# Patient Record
Sex: FEMALE | HISPANIC OR LATINO | Employment: OTHER | URBAN - METROPOLITAN AREA
[De-identification: names, ages, dates, MRNs, and addresses within clinical notes are randomized per-mention and may not be internally consistent; named-entity substitution may affect disease eponyms.]

---

## 2019-10-17 ENCOUNTER — HOSPITAL ENCOUNTER (INPATIENT)
Facility: MEDICAL CENTER | Age: 74
LOS: 5 days | DRG: 028 | End: 2019-10-22
Attending: EMERGENCY MEDICINE | Admitting: HOSPITALIST
Payer: MEDICAID

## 2019-10-17 ENCOUNTER — APPOINTMENT (OUTPATIENT)
Dept: RADIOLOGY | Facility: MEDICAL CENTER | Age: 74
DRG: 028 | End: 2019-10-17
Attending: EMERGENCY MEDICINE
Payer: MEDICAID

## 2019-10-17 DIAGNOSIS — R20.2 PARESTHESIAS: ICD-10-CM

## 2019-10-17 DIAGNOSIS — S06.4XAA EPIDURAL HEMATOMA (HCC): ICD-10-CM

## 2019-10-17 DIAGNOSIS — I62.1: ICD-10-CM

## 2019-10-17 DIAGNOSIS — M54.50 ACUTE LOW BACK PAIN, UNSPECIFIED BACK PAIN LATERALITY, UNSPECIFIED WHETHER SCIATICA PRESENT: ICD-10-CM

## 2019-10-17 DIAGNOSIS — I10 HTN (HYPERTENSION): ICD-10-CM

## 2019-10-17 PROBLEM — E87.6 HYPOKALEMIA: Status: ACTIVE | Noted: 2019-10-17

## 2019-10-17 LAB
ANION GAP SERPL CALC-SCNC: 12 MMOL/L (ref 0–11.9)
APTT PPP: 29.8 SEC (ref 24.7–36)
BASOPHILS # BLD AUTO: 0.4 % (ref 0–1.8)
BASOPHILS # BLD: 0.04 K/UL (ref 0–0.12)
BUN SERPL-MCNC: 16 MG/DL (ref 8–22)
CALCIUM SERPL-MCNC: 9.7 MG/DL (ref 8.5–10.5)
CFT BLD TEG: 6.5 MIN (ref 5–10)
CHLORIDE SERPL-SCNC: 103 MMOL/L (ref 96–112)
CLOT ANGLE BLD TEG: 59.9 DEGREES (ref 53–72)
CLOT LYSIS 30M P MA LENFR BLD TEG: 0 % (ref 0–8)
CO2 SERPL-SCNC: 24 MMOL/L (ref 20–33)
CREAT SERPL-MCNC: 0.75 MG/DL (ref 0.5–1.4)
CT.EXTRINSIC BLD ROTEM: 2.2 MIN (ref 1–3)
EOSINOPHIL # BLD AUTO: 0.03 K/UL (ref 0–0.51)
EOSINOPHIL NFR BLD: 0.3 % (ref 0–6.9)
ERYTHROCYTE [DISTWIDTH] IN BLOOD BY AUTOMATED COUNT: 43.8 FL (ref 35.9–50)
EST. AVERAGE GLUCOSE BLD GHB EST-MCNC: 123 MG/DL
GLUCOSE SERPL-MCNC: 146 MG/DL (ref 65–99)
HBA1C MFR BLD: 5.9 % (ref 0–5.6)
HCT VFR BLD AUTO: 46.8 % (ref 37–47)
HGB BLD-MCNC: 14.9 G/DL (ref 12–16)
IMM GRANULOCYTES # BLD AUTO: 0.04 K/UL (ref 0–0.11)
IMM GRANULOCYTES NFR BLD AUTO: 0.4 % (ref 0–0.9)
INR PPP: 0.94 (ref 0.87–1.13)
LYMPHOCYTES # BLD AUTO: 1.06 K/UL (ref 1–4.8)
LYMPHOCYTES NFR BLD: 10.5 % (ref 22–41)
MCF BLD TEG: 65.8 MM (ref 50–70)
MCH RBC QN AUTO: 27.9 PG (ref 27–33)
MCHC RBC AUTO-ENTMCNC: 31.8 G/DL (ref 33.6–35)
MCV RBC AUTO: 87.5 FL (ref 81.4–97.8)
MONOCYTES # BLD AUTO: 0.33 K/UL (ref 0–0.85)
MONOCYTES NFR BLD AUTO: 3.3 % (ref 0–13.4)
NEUTROPHILS # BLD AUTO: 8.6 K/UL (ref 2–7.15)
NEUTROPHILS NFR BLD: 85.1 % (ref 44–72)
NRBC # BLD AUTO: 0 K/UL
NRBC BLD-RTO: 0 /100 WBC
PA AA BLD-ACNC: 40.8 %
PA ADP BLD-ACNC: 1.6 %
PLATELET # BLD AUTO: 224 K/UL (ref 164–446)
PMV BLD AUTO: 11.4 FL (ref 9–12.9)
POTASSIUM SERPL-SCNC: 3.5 MMOL/L (ref 3.6–5.5)
PROTHROMBIN TIME: 12.8 SEC (ref 12–14.6)
RBC # BLD AUTO: 5.35 M/UL (ref 4.2–5.4)
SODIUM SERPL-SCNC: 139 MMOL/L (ref 135–145)
TEG ALGORITHM TGALG: NORMAL
WBC # BLD AUTO: 10.1 K/UL (ref 4.8–10.8)

## 2019-10-17 PROCEDURE — A9270 NON-COVERED ITEM OR SERVICE: HCPCS | Performed by: EMERGENCY MEDICINE

## 2019-10-17 PROCEDURE — 700105 HCHG RX REV CODE 258: Performed by: HOSPITALIST

## 2019-10-17 PROCEDURE — 83036 HEMOGLOBIN GLYCOSYLATED A1C: CPT

## 2019-10-17 PROCEDURE — 700102 HCHG RX REV CODE 250 W/ 637 OVERRIDE(OP): Performed by: EMERGENCY MEDICINE

## 2019-10-17 PROCEDURE — 85347 COAGULATION TIME ACTIVATED: CPT

## 2019-10-17 PROCEDURE — 99285 EMERGENCY DEPT VISIT HI MDM: CPT

## 2019-10-17 PROCEDURE — A9270 NON-COVERED ITEM OR SERVICE: HCPCS | Performed by: HOSPITALIST

## 2019-10-17 PROCEDURE — 72100 X-RAY EXAM L-S SPINE 2/3 VWS: CPT

## 2019-10-17 PROCEDURE — 72149 MRI LUMBAR SPINE W/DYE: CPT

## 2019-10-17 PROCEDURE — 85576 BLOOD PLATELET AGGREGATION: CPT | Mod: 91

## 2019-10-17 PROCEDURE — A9576 INJ PROHANCE MULTIPACK: HCPCS | Performed by: EMERGENCY MEDICINE

## 2019-10-17 PROCEDURE — 304561 HCHG STAT O2

## 2019-10-17 PROCEDURE — 84443 ASSAY THYROID STIM HORMONE: CPT

## 2019-10-17 PROCEDURE — 700117 HCHG RX CONTRAST REV CODE 255: Performed by: EMERGENCY MEDICINE

## 2019-10-17 PROCEDURE — 85025 COMPLETE CBC W/AUTO DIFF WBC: CPT

## 2019-10-17 PROCEDURE — 96375 TX/PRO/DX INJ NEW DRUG ADDON: CPT

## 2019-10-17 PROCEDURE — 85384 FIBRINOGEN ACTIVITY: CPT

## 2019-10-17 PROCEDURE — 80048 BASIC METABOLIC PNL TOTAL CA: CPT

## 2019-10-17 PROCEDURE — 770001 HCHG ROOM/CARE - MED/SURG/GYN PRIV*

## 2019-10-17 PROCEDURE — 96374 THER/PROPH/DIAG INJ IV PUSH: CPT

## 2019-10-17 PROCEDURE — 72148 MRI LUMBAR SPINE W/O DYE: CPT

## 2019-10-17 PROCEDURE — 99223 1ST HOSP IP/OBS HIGH 75: CPT | Performed by: HOSPITALIST

## 2019-10-17 PROCEDURE — 700102 HCHG RX REV CODE 250 W/ 637 OVERRIDE(OP): Performed by: HOSPITALIST

## 2019-10-17 PROCEDURE — 700111 HCHG RX REV CODE 636 W/ 250 OVERRIDE (IP): Performed by: EMERGENCY MEDICINE

## 2019-10-17 PROCEDURE — 36415 COLL VENOUS BLD VENIPUNCTURE: CPT

## 2019-10-17 PROCEDURE — 85730 THROMBOPLASTIN TIME PARTIAL: CPT

## 2019-10-17 PROCEDURE — 85610 PROTHROMBIN TIME: CPT

## 2019-10-17 RX ORDER — VALSARTAN 80 MG/1
320 TABLET ORAL ONCE
Status: COMPLETED | OUTPATIENT
Start: 2019-10-17 | End: 2019-10-17

## 2019-10-17 RX ORDER — POLYETHYLENE GLYCOL 3350 17 G/17G
1 POWDER, FOR SOLUTION ORAL
Status: DISCONTINUED | OUTPATIENT
Start: 2019-10-17 | End: 2019-10-22 | Stop reason: HOSPADM

## 2019-10-17 RX ORDER — OXYCODONE HYDROCHLORIDE 5 MG/1
2.5 TABLET ORAL
Status: DISCONTINUED | OUTPATIENT
Start: 2019-10-17 | End: 2019-10-20

## 2019-10-17 RX ORDER — METOPROLOL SUCCINATE 50 MG/1
100 TABLET, EXTENDED RELEASE ORAL ONCE
Status: COMPLETED | OUTPATIENT
Start: 2019-10-17 | End: 2019-10-17

## 2019-10-17 RX ORDER — ONDANSETRON 2 MG/ML
4 INJECTION INTRAMUSCULAR; INTRAVENOUS ONCE
Status: COMPLETED | OUTPATIENT
Start: 2019-10-17 | End: 2019-10-17

## 2019-10-17 RX ORDER — OXYCODONE HYDROCHLORIDE 5 MG/1
5 TABLET ORAL
Status: DISCONTINUED | OUTPATIENT
Start: 2019-10-17 | End: 2019-10-20

## 2019-10-17 RX ORDER — HYDROCHLOROTHIAZIDE 25 MG/1
25 TABLET ORAL ONCE
Status: COMPLETED | OUTPATIENT
Start: 2019-10-17 | End: 2019-10-17

## 2019-10-17 RX ORDER — SODIUM CHLORIDE 9 MG/ML
INJECTION, SOLUTION INTRAVENOUS CONTINUOUS
Status: DISCONTINUED | OUTPATIENT
Start: 2019-10-17 | End: 2019-10-21

## 2019-10-17 RX ORDER — MORPHINE SULFATE 4 MG/ML
4 INJECTION, SOLUTION INTRAMUSCULAR; INTRAVENOUS ONCE
Status: COMPLETED | OUTPATIENT
Start: 2019-10-17 | End: 2019-10-17

## 2019-10-17 RX ORDER — HYDROMORPHONE HYDROCHLORIDE 1 MG/ML
0.25 INJECTION, SOLUTION INTRAMUSCULAR; INTRAVENOUS; SUBCUTANEOUS
Status: DISCONTINUED | OUTPATIENT
Start: 2019-10-17 | End: 2019-10-20

## 2019-10-17 RX ORDER — ONDANSETRON 2 MG/ML
4 INJECTION INTRAMUSCULAR; INTRAVENOUS EVERY 4 HOURS PRN
Status: DISCONTINUED | OUTPATIENT
Start: 2019-10-17 | End: 2019-10-22 | Stop reason: HOSPADM

## 2019-10-17 RX ORDER — LEVOTHYROXINE SODIUM 112 UG/1
112 TABLET ORAL
Status: DISCONTINUED | OUTPATIENT
Start: 2019-10-17 | End: 2019-10-22 | Stop reason: HOSPADM

## 2019-10-17 RX ORDER — POTASSIUM CHLORIDE 20 MEQ/1
20 TABLET, EXTENDED RELEASE ORAL ONCE
Status: COMPLETED | OUTPATIENT
Start: 2019-10-17 | End: 2019-10-17

## 2019-10-17 RX ORDER — LABETALOL HYDROCHLORIDE 5 MG/ML
10 INJECTION, SOLUTION INTRAVENOUS EVERY 4 HOURS PRN
Status: DISCONTINUED | OUTPATIENT
Start: 2019-10-17 | End: 2019-10-22 | Stop reason: HOSPADM

## 2019-10-17 RX ORDER — KETOROLAC TROMETHAMINE 30 MG/ML
15 INJECTION, SOLUTION INTRAMUSCULAR; INTRAVENOUS ONCE
Status: COMPLETED | OUTPATIENT
Start: 2019-10-17 | End: 2019-10-17

## 2019-10-17 RX ORDER — AMOXICILLIN 250 MG
2 CAPSULE ORAL 2 TIMES DAILY
Status: DISCONTINUED | OUTPATIENT
Start: 2019-10-17 | End: 2019-10-22 | Stop reason: HOSPADM

## 2019-10-17 RX ORDER — LISINOPRIL 10 MG/1
10 TABLET ORAL DAILY
COMMUNITY
End: 2019-10-17

## 2019-10-17 RX ORDER — ONDANSETRON 4 MG/1
4 TABLET, ORALLY DISINTEGRATING ORAL EVERY 4 HOURS PRN
Status: DISCONTINUED | OUTPATIENT
Start: 2019-10-17 | End: 2019-10-22 | Stop reason: HOSPADM

## 2019-10-17 RX ORDER — BISACODYL 10 MG
10 SUPPOSITORY, RECTAL RECTAL
Status: DISCONTINUED | OUTPATIENT
Start: 2019-10-17 | End: 2019-10-22 | Stop reason: HOSPADM

## 2019-10-17 RX ORDER — LEVOTHYROXINE SODIUM 112 UG/1
112 TABLET ORAL
COMMUNITY
End: 2024-02-12

## 2019-10-17 RX ADMIN — SENNOSIDES, DOCUSATE SODIUM 2 TABLET: 50; 8.6 TABLET, FILM COATED ORAL at 17:27

## 2019-10-17 RX ADMIN — OXYCODONE HYDROCHLORIDE 5 MG: 5 TABLET ORAL at 17:27

## 2019-10-17 RX ADMIN — SODIUM CHLORIDE: 9 INJECTION, SOLUTION INTRAVENOUS at 17:29

## 2019-10-17 RX ADMIN — ONDANSETRON 4 MG: 2 INJECTION INTRAMUSCULAR; INTRAVENOUS at 07:40

## 2019-10-17 RX ADMIN — KETOROLAC TROMETHAMINE 15 MG: 30 INJECTION, SOLUTION INTRAMUSCULAR at 07:54

## 2019-10-17 RX ADMIN — GADOTERIDOL 15 ML: 279.3 INJECTION, SOLUTION INTRAVENOUS at 13:30

## 2019-10-17 RX ADMIN — METOPROLOL SUCCINATE 100 MG: 50 TABLET, EXTENDED RELEASE ORAL at 09:01

## 2019-10-17 RX ADMIN — MORPHINE SULFATE 4 MG: 4 INJECTION INTRAVENOUS at 07:40

## 2019-10-17 RX ADMIN — POTASSIUM CHLORIDE 20 MEQ: 1500 TABLET, EXTENDED RELEASE ORAL at 17:27

## 2019-10-17 RX ADMIN — HYDROCHLOROTHIAZIDE 25 MG: 25 TABLET ORAL at 09:01

## 2019-10-17 RX ADMIN — VALSARTAN 320 MG: 80 TABLET, FILM COATED ORAL at 09:01

## 2019-10-17 ASSESSMENT — COGNITIVE AND FUNCTIONAL STATUS - GENERAL
MOVING FROM LYING ON BACK TO SITTING ON SIDE OF FLAT BED: A LITTLE
STANDING UP FROM CHAIR USING ARMS: A LITTLE
SUGGESTED CMS G CODE MODIFIER MOBILITY: CK
MOVING TO AND FROM BED TO CHAIR: A LITTLE
DAILY ACTIVITIY SCORE: 24
MOBILITY SCORE: 19
SUGGESTED CMS G CODE MODIFIER DAILY ACTIVITY: CH
CLIMB 3 TO 5 STEPS WITH RAILING: A LITTLE
WALKING IN HOSPITAL ROOM: A LITTLE

## 2019-10-17 ASSESSMENT — PATIENT HEALTH QUESTIONNAIRE - PHQ9
1. LITTLE INTEREST OR PLEASURE IN DOING THINGS: NOT AT ALL
SUM OF ALL RESPONSES TO PHQ9 QUESTIONS 1 AND 2: 0
2. FEELING DOWN, DEPRESSED, IRRITABLE, OR HOPELESS: NOT AT ALL

## 2019-10-17 ASSESSMENT — LIFESTYLE VARIABLES
EVER_SMOKED: NEVER
AVERAGE NUMBER OF DAYS PER WEEK YOU HAVE A DRINK CONTAINING ALCOHOL: 0
HAVE YOU EVER FELT YOU SHOULD CUT DOWN ON YOUR DRINKING: NO
TOTAL SCORE: 0
EVER FELT BAD OR GUILTY ABOUT YOUR DRINKING: NO
DOES PATIENT WANT TO STOP DRINKING: NO
TOTAL SCORE: 0
HOW MANY TIMES IN THE PAST YEAR HAVE YOU HAD 5 OR MORE DRINKS IN A DAY: 0
TOTAL SCORE: 0
DO YOU DRINK ALCOHOL: NO
HAVE PEOPLE ANNOYED YOU BY CRITICIZING YOUR DRINKING: NO
ON A TYPICAL DAY WHEN YOU DRINK ALCOHOL HOW MANY DRINKS DO YOU HAVE: 0
CONSUMPTION TOTAL: NEGATIVE
EVER HAD A DRINK FIRST THING IN THE MORNING TO STEADY YOUR NERVES TO GET RID OF A HANGOVER: NO
ALCOHOL_USE: NO

## 2019-10-17 NOTE — ED PROVIDER NOTES
ED Provider Note    Scribed for Juan Cornell M.D. by Sunday Francis. 10/17/2019  7:26 AM    Primary care provider: Susi Grant M.D.  Means of arrival: walk in  History obtained from: patient  History limited by: none    CHIEF COMPLAINT  Chief Complaint   Patient presents with   • Low Back Pain     Lower back pain since 2 am   • Numbness     right lower leg numbness since 2am       HPI  Desiree Salas is a 74 y.o. female who presents to the Emergency Department complaining of sudden low back pain starting 5 hours ago. She describes her pain as moderate that radiates down her right leg. Patient reports associated calf numbness. She states that her pain came on suddenly this morning with no precipitating factors. Patient reports that her pain has not improved, promtiong her to come to the ED for evaluation. She reports a history of hypertension, cancer. Patient denies focal weakness, trauma.      REVIEW OF SYSTEMS  Pertinent positives include back pain, numbness.   Pertinent negatives include no focal weakness, trauma.    All other systems reviewed and negative. See HPI for further details.     PAST MEDICAL HISTORY   has a past medical history of ASTHMA, Breast mass, Dyslipidemia, Heart attack (HCC), HTN, Post-menopausal, and Thyroid disease.    SURGICAL HISTORY  patient denies any surgical history    SOCIAL HISTORY  Social History     Tobacco Use   • Smoking status: Never Smoker   • Smokeless tobacco: Never Used   Substance Use Topics   • Alcohol use: No   • Drug use: No      Social History     Substance and Sexual Activity   Drug Use No       FAMILY HISTORY  Family History   Problem Relation Age of Onset   • Heart Disease Father        CURRENT MEDICATIONS  Home Medications     Reviewed by Gil Brumfield R.N. (Registered Nurse) on 10/17/19 at 0659  Med List Status: Partial   Medication Last Dose Status   aspirin 81 MG EC tablet  Active   atorvastatin (LIPITOR) 40 MG TABS  Active  "  ergocalciferol (DRISDOL) 85164 UNIT capsule  Active   hydrochlorothiazide (HYDRODIURIL) 25 MG TABS  Active   levothyroxine (SYNTHROID) 175 MCG TABS  Active   metoprolol (LOPRESSOR) 100 MG TABS  Active   valsartan (DIOVAN) 320 MG tablet  Active                ALLERGIES  Allergies   Allergen Reactions   • Pcn [Penicillins] Rash   • Tylenol [Acetaminophen]      Dizziness        PHYSICAL EXAM  VITAL SIGNS: BP (!) 199/101   Pulse 70   Temp 35.9 °C (96.6 °F) (Temporal)   Resp 16   Ht 1.499 m (4' 11\")   Wt 72.6 kg (160 lb)   LMP 02/05/2003   SpO2 98%   BMI 32.32 kg/m²     Nursing note and vitals reviewed.  Constitutional: Well-developed and well-nourished. Mild distress.   HENT: Head is normocephalic and atraumatic. Oropharynx is clear and moist without exudate or erythema.   Eyes: Pupils are equal, round, and reactive to light. Conjunctiva are normal.   Cardiovascular: Normal rate and regular rhythm. No murmur heard. Normal radial pulses.  Pulmonary/Chest: Breath sounds normal. No wheezes or rales.   Abdominal: Soft and non-tender. No distention    Musculoskeletal: Extremities exhibit normal range of motion without edema. Lumbar paraspinal tenderness with muscle spasm.   Neurological: Awake, alert and oriented to person, place, and time. No focal deficits noted. Decreased sensation to light tough in right anterior thigh.   Skin: Skin is warm and dry. No rash.   Psychiatric: Normal mood and affect. Appropriate for clinical situation.    DIAGNOSTIC STUDIES / PROCEDURES    LABS  Results for orders placed or performed during the hospital encounter of 10/17/19   CBC WITH DIFFERENTIAL   Result Value Ref Range    WBC 10.1 4.8 - 10.8 K/uL    RBC 5.35 4.20 - 5.40 M/uL    Hemoglobin 14.9 12.0 - 16.0 g/dL    Hematocrit 46.8 37.0 - 47.0 %    MCV 87.5 81.4 - 97.8 fL    MCH 27.9 27.0 - 33.0 pg    MCHC 31.8 (L) 33.6 - 35.0 g/dL    RDW 43.8 35.9 - 50.0 fL    Platelet Count 224 164 - 446 K/uL    MPV 11.4 9.0 - 12.9 fL    " Neutrophils-Polys 85.10 (H) 44.00 - 72.00 %    Lymphocytes 10.50 (L) 22.00 - 41.00 %    Monocytes 3.30 0.00 - 13.40 %    Eosinophils 0.30 0.00 - 6.90 %    Basophils 0.40 0.00 - 1.80 %    Immature Granulocytes 0.40 0.00 - 0.90 %    Nucleated RBC 0.00 /100 WBC    Neutrophils (Absolute) 8.60 (H) 2.00 - 7.15 K/uL    Lymphs (Absolute) 1.06 1.00 - 4.80 K/uL    Monos (Absolute) 0.33 0.00 - 0.85 K/uL    Eos (Absolute) 0.03 0.00 - 0.51 K/uL    Baso (Absolute) 0.04 0.00 - 0.12 K/uL    Immature Granulocytes (abs) 0.04 0.00 - 0.11 K/uL    NRBC (Absolute) 0.00 K/uL   BASIC METABOLIC PANEL   Result Value Ref Range    Sodium 139 135 - 145 mmol/L    Potassium 3.5 (L) 3.6 - 5.5 mmol/L    Chloride 103 96 - 112 mmol/L    Co2 24 20 - 33 mmol/L    Glucose 146 (H) 65 - 99 mg/dL    Bun 16 8 - 22 mg/dL    Creatinine 0.75 0.50 - 1.40 mg/dL    Calcium 9.7 8.5 - 10.5 mg/dL    Anion Gap 12.0 (H) 0.0 - 11.9   PROTHROMBIN TIME (INR)   Result Value Ref Range    PT 12.8 12.0 - 14.6 sec    INR 0.94 0.87 - 1.13   APTT   Result Value Ref Range    APTT 29.8 24.7 - 36.0 sec   ESTIMATED GFR   Result Value Ref Range    GFR If African American >60 >60 mL/min/1.73 m 2    GFR If Non African American >60 >60 mL/min/1.73 m 2   All labs reviewed by me.    RADIOLOGY  MR-LUMBAR SPINE-W/O   Final Result         1.  EXTENSIVE LARGE ACUTE DORSAL EPIDURAL HEMATOMA causing marked compression of the posterior aspect of the thecal sac from the T11 level to the sacrum.      These findings were discussed with Dr LADY WELSH at 11:10 AM 10/17/2019.      DX-LUMBAR SPINE-2 OR 3 VIEWS   Final Result         1.  No acute findings.      2.  Mild multilevel degenerative disc disease.      3.  Severe facet arthropathy in the lower lumbar spine.      4.  Minimal retrolisthesis at L2-3.      5.  Minimal anterolisthesis at L4-5 and L5-S1.      MR-LUMBAR SPINE-WITH    (Results Pending)   The radiologist's interpretation of all radiological studies have been reviewed by  me.    COURSE & MEDICAL DECISION MAKING  Nursing notes, VS, PMSFHx reviewed in chart.     7:26 AM - Patient seen and examined at bedside. Based on her advanced age and history of cancer, I discussed obtaining an MRI scan of the area to evaluate. Patient verbalizes understanding and agreement to this plan of care. Patient will be treated with Morphine 4 mg, Toradol 15 mg Zofran 4 mg. Ordered MR lumbar, DX lumbar spine, CBC with differential, BMP, PT/INR, APTT to evaluate her symptoms. The differential diagnoses include but are not limited to: metastatic mass, sciatica, spinal fracture.     8:39 AM - Patient will be treated with Diovan 320 mg, Toprol  mg, Hydrodiuril 25 mg.     11:10 AM - MRI indicates large epidural hematoma.     11:16 AM - Paged neurosurgery.     11:35 AM - I discussed the patient's case and the above findings with Dr. Cruz (neurosurgery) who agrees to consult. He requests MRI with IV contrast. Ordered for MR lumbar with contrast to further evaluate.     11:39 AM - I discussed the patient's case and the above findings via Anna Text with Dr. Iniguez (hospitalist) who agrees to admit the patient.     11:54 AM - Patient reevaluated at bedside. She remains neurologically intact with the exception of numbness in right foot. Discussed results. Patient confirms that she has not had injections or trauma to the area. Discussed admission to the hospital for continued observation and treatment. Patient and family verbalize agreement and understanding.      DISPOSITION:  Patient will be admitted to hospital in critical condition.    CRITICAL CARE  I provided critical care services, which included medication orders, frequent reevaluations of the patient's condition and response to treatment, ordering and reviewing test results, and discussing the case with various consultants.  The critical care time associated with the care of the patient was 35 minutes. Review chart for interventions. This time is  exclusive of any other billable procedures.      FINAL IMPRESSION  1. Acute midline low back pain with right-sided sciatica    2. Nontraumatic epidural hematoma (HCC)     Critical care time 35 minutes exclusive of any other billable procedures.     ISunday (Scribe), am scribing for, and in the presence of, Juan Cornell M.D..    Electronically signed by: Sunday Francis (Scribe), 10/17/2019    I, Juan Cornell M.D. personally performed the services described in this documentation, as scribed by Sunday Francis in my presence, and it is both accurate and complete. C    The note accurately reflects work and decisions made by me.  Juan Cornell  10/17/2019  1:06 PM

## 2019-10-17 NOTE — ASSESSMENT & PLAN NOTE
Last TSH was in 2013   continue levothyroxine home dose 112 at this time and reassess with new labs

## 2019-10-17 NOTE — ASSESSMENT & PLAN NOTE
Oral BP meds were held on admission to avoid hypotension  She developed hypertension and then later hypotension with orthostatic signs   Echo did not show any abnormalities  We will resume valsartan with low-dose 80 mg daily(home dose is 320 mg)  Monitoring closely PRN IV labetalol  Adjust her dose or add a new agent if needed.

## 2019-10-17 NOTE — H&P
Hospital Medicine History & Physical Note    Date of Service  10/17/2019    Primary Care Physician  Susi Grant M.D.    Consultants  NSGY    Code Status  Full    Chief Complaint  Back pain    History of Presenting Illness  74 y.o. female who presented 10/17/2019 with history of hypertension dyslipidemia reports that this morning while trying to go to the bathroom she noted weakness in her right leg so she had to crawl back to bed she was transferred to the emergency room for further evaluation.  She is complaining of low back pain moderate sharp radiates down her right lower extremity and right buttock associated with numbness in her right calf and her right foot.  No perineal numbness no change in urine or bowel habits.  No falls or trauma.  She occasionally takes low-dose aspirin she took one yesterday.  She denies any other antiplatelet or anticoagulant.    Review of Systems  Review of Systems   All other systems reviewed and are negative.      Past Medical History   has a past medical history of ASTHMA, Breast mass, Dyslipidemia, Heart attack (HCC), HTN, Post-menopausal, and Thyroid disease.    Surgical History  Negative per patient    Family History  family history includes Heart Disease in her father.     Social History   reports that she has never smoked. She has never used smokeless tobacco. She reports that she does not drink alcohol or use drugs.    Allergies  Allergies   Allergen Reactions   • Pcn [Penicillins] Rash   • Tylenol [Acetaminophen]      Dizziness        Medications  Prior to Admission Medications   Prescriptions Last Dose Informant Patient Reported? Taking?   levothyroxine (SYNTHROID) 112 MCG Tab 10/16/2019 at unknown Patient's Home Pharmacy Yes Yes   Sig: Take 112 mcg by mouth Every morning on an empty stomach.   metoprolol (LOPRESSOR) 100 MG TABS 10/16/2019 at unknown Patient's Home Pharmacy No No   Sig: Take 1 Tab by mouth 2 times a day.   valsartan (DIOVAN) 320 MG tablet 10/16/2019 at  unknown Patient's Home Pharmacy No No   Sig: Take 1 Tab by mouth every day.      Facility-Administered Medications: None       Physical Exam  Temp:  [35.9 °C (96.6 °F)] 35.9 °C (96.6 °F)  Pulse:  [58-75] 68  Resp:  [16] 16  BP: (130-215)/() 156/76  SpO2:  [94 %-98 %] 97 %    Physical Exam   Constitutional: She is oriented to person, place, and time. She appears well-developed and well-nourished.   HENT:   Head: Normocephalic and atraumatic.   Right Ear: External ear normal.   Left Ear: External ear normal.   Mouth/Throat: No oropharyngeal exudate.   Eyes: Conjunctivae are normal. Right eye exhibits no discharge. Left eye exhibits no discharge. No scleral icterus.   Neck: Neck supple. No JVD present. No tracheal deviation present.   Cardiovascular: Normal rate and regular rhythm. Exam reveals no gallop and no friction rub.   No murmur heard.  Pulmonary/Chest: Effort normal and breath sounds normal. No stridor. No respiratory distress. She has no wheezes. She has no rales. She exhibits no tenderness.   Abdominal: Soft. Bowel sounds are normal. She exhibits no distension and no mass. There is no tenderness. There is no rebound and no guarding.   Musculoskeletal: She exhibits no edema or tenderness.   Neurological: She is alert and oriented to person, place, and time. No cranial nerve deficit. She exhibits abnormal muscle tone (Weakness right dorsiflexion).   Skin: Skin is warm and dry. She is not diaphoretic. No cyanosis. Nails show no clubbing.   Psychiatric: She has a normal mood and affect. Her behavior is normal. Thought content normal.   Nursing note and vitals reviewed.      Laboratory:  Recent Labs     10/17/19  0720   WBC 10.1   RBC 5.35   HEMOGLOBIN 14.9   HEMATOCRIT 46.8   MCV 87.5   MCH 27.9   MCHC 31.8*   RDW 43.8   PLATELETCT 224   MPV 11.4     Recent Labs     10/17/19  0720   SODIUM 139   POTASSIUM 3.5*   CHLORIDE 103   CO2 24   GLUCOSE 146*   BUN 16   CREATININE 0.75   CALCIUM 9.7     Recent Labs      10/17/19  0720   GLUCOSE 146*     Recent Labs     10/17/19  0720   APTT 29.8   INR 0.94     No results for input(s): NTPROBNP in the last 72 hours.      No results for input(s): TROPONINT in the last 72 hours.    Urinalysis:    No results found     Imaging:  MR-LUMBAR SPINE-WITH   Final Result         1.  Large acute dorsal epidural hematoma causing marked compression of the thecal sac from the T12 level to the sacrum.      2.  No evidence of abnormal intradural or extradural enhancement in the lumbar region.      MR-LUMBAR SPINE-W/O   Final Result         1.  EXTENSIVE LARGE ACUTE DORSAL EPIDURAL HEMATOMA causing marked compression of the posterior aspect of the thecal sac from the T11 level to the sacrum.      These findings were discussed with Dr LADY WELSH at 11:10 AM 10/17/2019.      DX-LUMBAR SPINE-2 OR 3 VIEWS   Final Result         1.  No acute findings.      2.  Mild multilevel degenerative disc disease.      3.  Severe facet arthropathy in the lower lumbar spine.      4.  Minimal retrolisthesis at L2-3.      5.  Minimal anterolisthesis at L4-5 and L5-S1.            Assessment/Plan:  I anticipate this patient will require at least two midnights for appropriate medical management, necessitating inpatient admission.    * Epidural hematoma (HCC)  Assessment & Plan  Uncertain etiology  MRI with contrast negative for mass, no history of trauma   No coagulopathy on TEG    Dr. Cruz from neurosurgery has been consulted will await his evaluation and further recommendations  Serial neurologic exams      Hypokalemia  Assessment & Plan  Replete and monitor     IFG (impaired fasting glucose)- (present on admission)  Assessment & Plan  Monitor CBGs  Check HbA1c    Hypothyroid- (present on admission)  Assessment & Plan  Continue levothyroxine    Hypertension- (present on admission)  Assessment & Plan  We will hold oral BP meds to avoid hypotension  Monitor blood pressure and treat accordingly  PRN IV  labetalol      Plan of care reviewed with patient and family at bedside and the questions answered    VTE prophylaxis: SCD

## 2019-10-17 NOTE — ED NOTES
Pt resting in room with family. Pt c/o 10/10 pain from lower back to right foot. Numbness in RLE. Hypertensive, asymptomatic. PIV placed. Labs drawn and sent.

## 2019-10-17 NOTE — ED TRIAGE NOTES
"Desiree Figueredo de Domenica  74 y.o. female  Chief Complaint   Patient presents with   • Low Back Pain     Lower back pain since 2 am   • Numbness     right lower leg numbness since 2am     Pt wheeled to triage for above complaint.      CMS intact, pt reports mildly decreased sensation to her RLE. Reports hx of HTN, has not taken her morning medications. Denies CP/SOB.     Pt back to lobby. Educated to inform staff of any concerns or changes.     Blood Pressure : (!) 199/101, Pulse: 70, Respiration: 16, Temperature: 35.9 °C (96.6 °F), Height: 149.9 cm (4' 11\"), Weight: 72.6 kg (160 lb), BMI (Calculated): 32.32, BSA (Calculated): 1.7, Pulse Oximetry: 98 %, O2 Delivery: None (Room Air)    "

## 2019-10-17 NOTE — ASSESSMENT & PLAN NOTE
Uncertain etiology: Denies trauma, TEG study was normal  MRI with contrast negative for mass but showed huge hematoma, no history of trauma   Dr. Cruz from neurosurgery>>Lumbar 1-5 laminectomy done on 10/18  Improving after surgery.  No weakness on exam  PT OT eval : PT with home health  Monitor her today for dizziness and orthostatic hypotension  Discharge tomorrow if she stays stable

## 2019-10-17 NOTE — ED NOTES
Med rec complete per pt, family at bedside, and a call to [facility] pharmacy @ [phone]  Allergies reviewed  No ABX in last 14 days

## 2019-10-18 ENCOUNTER — APPOINTMENT (OUTPATIENT)
Dept: RADIOLOGY | Facility: MEDICAL CENTER | Age: 74
DRG: 028 | End: 2019-10-18
Attending: NEUROLOGICAL SURGERY
Payer: MEDICAID

## 2019-10-18 ENCOUNTER — ANESTHESIA EVENT (OUTPATIENT)
Dept: SURGERY | Facility: MEDICAL CENTER | Age: 74
DRG: 028 | End: 2019-10-18
Payer: MEDICAID

## 2019-10-18 ENCOUNTER — ANESTHESIA (OUTPATIENT)
Dept: SURGERY | Facility: MEDICAL CENTER | Age: 74
DRG: 028 | End: 2019-10-18
Payer: MEDICAID

## 2019-10-18 LAB
ANION GAP SERPL CALC-SCNC: 8 MMOL/L (ref 0–11.9)
BASOPHILS # BLD AUTO: 0.5 % (ref 0–1.8)
BASOPHILS # BLD: 0.04 K/UL (ref 0–0.12)
BUN SERPL-MCNC: 17 MG/DL (ref 8–22)
CALCIUM SERPL-MCNC: 9.4 MG/DL (ref 8.5–10.5)
CHLORIDE SERPL-SCNC: 106 MMOL/L (ref 96–112)
CO2 SERPL-SCNC: 27 MMOL/L (ref 20–33)
CREAT SERPL-MCNC: 0.88 MG/DL (ref 0.5–1.4)
EKG IMPRESSION: NORMAL
EOSINOPHIL # BLD AUTO: 0.12 K/UL (ref 0–0.51)
EOSINOPHIL NFR BLD: 1.5 % (ref 0–6.9)
ERYTHROCYTE [DISTWIDTH] IN BLOOD BY AUTOMATED COUNT: 44.1 FL (ref 35.9–50)
GLUCOSE SERPL-MCNC: 121 MG/DL (ref 65–99)
HCT VFR BLD AUTO: 42 % (ref 37–47)
HGB BLD-MCNC: 13.7 G/DL (ref 12–16)
IMM GRANULOCYTES # BLD AUTO: 0.03 K/UL (ref 0–0.11)
IMM GRANULOCYTES NFR BLD AUTO: 0.4 % (ref 0–0.9)
LYMPHOCYTES # BLD AUTO: 1.66 K/UL (ref 1–4.8)
LYMPHOCYTES NFR BLD: 20.2 % (ref 22–41)
MCH RBC QN AUTO: 28.8 PG (ref 27–33)
MCHC RBC AUTO-ENTMCNC: 32.6 G/DL (ref 33.6–35)
MCV RBC AUTO: 88.4 FL (ref 81.4–97.8)
MONOCYTES # BLD AUTO: 0.57 K/UL (ref 0–0.85)
MONOCYTES NFR BLD AUTO: 6.9 % (ref 0–13.4)
NEUTROPHILS # BLD AUTO: 5.79 K/UL (ref 2–7.15)
NEUTROPHILS NFR BLD: 70.5 % (ref 44–72)
NRBC # BLD AUTO: 0 K/UL
NRBC BLD-RTO: 0 /100 WBC
PLATELET # BLD AUTO: 201 K/UL (ref 164–446)
PMV BLD AUTO: 11.3 FL (ref 9–12.9)
POTASSIUM SERPL-SCNC: 4 MMOL/L (ref 3.6–5.5)
RBC # BLD AUTO: 4.75 M/UL (ref 4.2–5.4)
SODIUM SERPL-SCNC: 141 MMOL/L (ref 135–145)
TSH SERPL DL<=0.005 MIU/L-ACNC: 3.31 UIU/ML (ref 0.38–5.33)
WBC # BLD AUTO: 8.2 K/UL (ref 4.8–10.8)

## 2019-10-18 PROCEDURE — 500367 HCHG DRAIN KIT, HEMOVAC: Performed by: NEUROLOGICAL SURGERY

## 2019-10-18 PROCEDURE — 72020 X-RAY EXAM OF SPINE 1 VIEW: CPT

## 2019-10-18 PROCEDURE — 700105 HCHG RX REV CODE 258: Performed by: HOSPITALIST

## 2019-10-18 PROCEDURE — 700105 HCHG RX REV CODE 258: Performed by: ANESTHESIOLOGY

## 2019-10-18 PROCEDURE — 160009 HCHG ANES TIME/MIN: Performed by: NEUROLOGICAL SURGERY

## 2019-10-18 PROCEDURE — 93010 ELECTROCARDIOGRAM REPORT: CPT | Performed by: INTERNAL MEDICINE

## 2019-10-18 PROCEDURE — 501838 HCHG SUTURE GENERAL: Performed by: NEUROLOGICAL SURGERY

## 2019-10-18 PROCEDURE — 700111 HCHG RX REV CODE 636 W/ 250 OVERRIDE (IP): Performed by: ANESTHESIOLOGY

## 2019-10-18 PROCEDURE — 85025 COMPLETE CBC W/AUTO DIFF WBC: CPT

## 2019-10-18 PROCEDURE — 700101 HCHG RX REV CODE 250: Performed by: ANESTHESIOLOGY

## 2019-10-18 PROCEDURE — 93005 ELECTROCARDIOGRAM TRACING: CPT | Performed by: PHYSICIAN ASSISTANT

## 2019-10-18 PROCEDURE — 700102 HCHG RX REV CODE 250 W/ 637 OVERRIDE(OP): Performed by: ANESTHESIOLOGY

## 2019-10-18 PROCEDURE — 00CU0ZZ EXTIRPATION OF MATTER FROM SPINAL CANAL, OPEN APPROACH: ICD-10-PCS | Performed by: NEUROLOGICAL SURGERY

## 2019-10-18 PROCEDURE — 770001 HCHG ROOM/CARE - MED/SURG/GYN PRIV*

## 2019-10-18 PROCEDURE — 160041 HCHG SURGERY MINUTES - EA ADDL 1 MIN LEVEL 4: Performed by: NEUROLOGICAL SURGERY

## 2019-10-18 PROCEDURE — 160036 HCHG PACU - EA ADDL 30 MINS PHASE I: Performed by: NEUROLOGICAL SURGERY

## 2019-10-18 PROCEDURE — 160002 HCHG RECOVERY MINUTES (STAT): Performed by: NEUROLOGICAL SURGERY

## 2019-10-18 PROCEDURE — 700111 HCHG RX REV CODE 636 W/ 250 OVERRIDE (IP): Performed by: NEUROLOGICAL SURGERY

## 2019-10-18 PROCEDURE — 500885 HCHG PACK, JACKSON TABLE: Performed by: NEUROLOGICAL SURGERY

## 2019-10-18 PROCEDURE — 502240 HCHG MISC OR SUPPLY RC 0272: Performed by: NEUROLOGICAL SURGERY

## 2019-10-18 PROCEDURE — 80048 BASIC METABOLIC PNL TOTAL CA: CPT

## 2019-10-18 PROCEDURE — 502648 HCHG APPLICATOR, EVICEL: Performed by: NEUROLOGICAL SURGERY

## 2019-10-18 PROCEDURE — 110454 HCHG SHELL REV 250: Performed by: NEUROLOGICAL SURGERY

## 2019-10-18 PROCEDURE — 160029 HCHG SURGERY MINUTES - 1ST 30 MINS LEVEL 4: Performed by: NEUROLOGICAL SURGERY

## 2019-10-18 PROCEDURE — A9270 NON-COVERED ITEM OR SERVICE: HCPCS | Performed by: ANESTHESIOLOGY

## 2019-10-18 PROCEDURE — A9270 NON-COVERED ITEM OR SERVICE: HCPCS | Performed by: HOSPITALIST

## 2019-10-18 PROCEDURE — 700101 HCHG RX REV CODE 250: Performed by: HOSPITALIST

## 2019-10-18 PROCEDURE — 500864 HCHG NEEDLE, SPINAL 18G: Performed by: NEUROLOGICAL SURGERY

## 2019-10-18 PROCEDURE — 700102 HCHG RX REV CODE 250 W/ 637 OVERRIDE(OP): Performed by: HOSPITALIST

## 2019-10-18 PROCEDURE — 700101 HCHG RX REV CODE 250: Performed by: NEUROLOGICAL SURGERY

## 2019-10-18 PROCEDURE — 160035 HCHG PACU - 1ST 60 MINS PHASE I: Performed by: NEUROLOGICAL SURGERY

## 2019-10-18 PROCEDURE — 36415 COLL VENOUS BLD VENIPUNCTURE: CPT

## 2019-10-18 PROCEDURE — 99232 SBSQ HOSP IP/OBS MODERATE 35: CPT | Performed by: INTERNAL MEDICINE

## 2019-10-18 PROCEDURE — 160048 HCHG OR STATISTICAL LEVEL 1-5: Performed by: NEUROLOGICAL SURGERY

## 2019-10-18 RX ORDER — ONDANSETRON 2 MG/ML
INJECTION INTRAMUSCULAR; INTRAVENOUS PRN
Status: DISCONTINUED | OUTPATIENT
Start: 2019-10-18 | End: 2019-10-18 | Stop reason: SURG

## 2019-10-18 RX ORDER — ACETAMINOPHEN 325 MG/1
650 TABLET ORAL EVERY 6 HOURS PRN
Status: DISCONTINUED | OUTPATIENT
Start: 2019-10-18 | End: 2019-10-20

## 2019-10-18 RX ORDER — BACITRACIN 50000 [IU]/1
INJECTION, POWDER, FOR SOLUTION INTRAMUSCULAR
Status: DISCONTINUED | OUTPATIENT
Start: 2019-10-18 | End: 2019-10-18 | Stop reason: HOSPADM

## 2019-10-18 RX ORDER — CEFAZOLIN SODIUM 1 G/3ML
INJECTION, POWDER, FOR SOLUTION INTRAMUSCULAR; INTRAVENOUS PRN
Status: DISCONTINUED | OUTPATIENT
Start: 2019-10-18 | End: 2019-10-18 | Stop reason: SURG

## 2019-10-18 RX ORDER — KETOROLAC TROMETHAMINE 30 MG/ML
15 INJECTION, SOLUTION INTRAMUSCULAR; INTRAVENOUS EVERY 6 HOURS
Status: DISCONTINUED | OUTPATIENT
Start: 2019-10-18 | End: 2019-10-19

## 2019-10-18 RX ORDER — HYDRALAZINE HYDROCHLORIDE 20 MG/ML
5 INJECTION INTRAMUSCULAR; INTRAVENOUS
Status: DISCONTINUED | OUTPATIENT
Start: 2019-10-18 | End: 2019-10-18 | Stop reason: HOSPADM

## 2019-10-18 RX ORDER — HALOPERIDOL 5 MG/ML
1 INJECTION INTRAMUSCULAR
Status: DISCONTINUED | OUTPATIENT
Start: 2019-10-18 | End: 2019-10-18 | Stop reason: HOSPADM

## 2019-10-18 RX ORDER — HYDROMORPHONE HYDROCHLORIDE 1 MG/ML
0.2 INJECTION, SOLUTION INTRAMUSCULAR; INTRAVENOUS; SUBCUTANEOUS
Status: DISCONTINUED | OUTPATIENT
Start: 2019-10-18 | End: 2019-10-18 | Stop reason: HOSPADM

## 2019-10-18 RX ORDER — HYDROMORPHONE HYDROCHLORIDE 1 MG/ML
0.1 INJECTION, SOLUTION INTRAMUSCULAR; INTRAVENOUS; SUBCUTANEOUS
Status: DISCONTINUED | OUTPATIENT
Start: 2019-10-18 | End: 2019-10-18 | Stop reason: HOSPADM

## 2019-10-18 RX ORDER — BUPIVACAINE HYDROCHLORIDE AND EPINEPHRINE 5; 5 MG/ML; UG/ML
INJECTION, SOLUTION PERINEURAL
Status: DISCONTINUED | OUTPATIENT
Start: 2019-10-18 | End: 2019-10-18 | Stop reason: HOSPADM

## 2019-10-18 RX ORDER — LIDOCAINE HYDROCHLORIDE 20 MG/ML
INJECTION, SOLUTION EPIDURAL; INFILTRATION; INTRACAUDAL; PERINEURAL PRN
Status: DISCONTINUED | OUTPATIENT
Start: 2019-10-18 | End: 2019-10-18 | Stop reason: SURG

## 2019-10-18 RX ORDER — GABAPENTIN 300 MG/1
300 CAPSULE ORAL ONCE
Status: COMPLETED | OUTPATIENT
Start: 2019-10-18 | End: 2019-10-18

## 2019-10-18 RX ORDER — ROCURONIUM BROMIDE 10 MG/ML
INJECTION, SOLUTION INTRAVENOUS PRN
Status: DISCONTINUED | OUTPATIENT
Start: 2019-10-18 | End: 2019-10-18 | Stop reason: SURG

## 2019-10-18 RX ORDER — MEPERIDINE HYDROCHLORIDE 25 MG/ML
25 INJECTION INTRAMUSCULAR; INTRAVENOUS; SUBCUTANEOUS
Status: DISCONTINUED | OUTPATIENT
Start: 2019-10-18 | End: 2019-10-18 | Stop reason: HOSPADM

## 2019-10-18 RX ORDER — DIPHENHYDRAMINE HYDROCHLORIDE 50 MG/ML
12.5 INJECTION INTRAMUSCULAR; INTRAVENOUS
Status: DISCONTINUED | OUTPATIENT
Start: 2019-10-18 | End: 2019-10-18 | Stop reason: HOSPADM

## 2019-10-18 RX ORDER — LORAZEPAM 2 MG/ML
0.5 INJECTION INTRAMUSCULAR
Status: DISCONTINUED | OUTPATIENT
Start: 2019-10-18 | End: 2019-10-18 | Stop reason: HOSPADM

## 2019-10-18 RX ORDER — PHENYLEPHRINE HCL IN 0.9% NACL 0.5 MG/5ML
SYRINGE (ML) INTRAVENOUS PRN
Status: DISCONTINUED | OUTPATIENT
Start: 2019-10-18 | End: 2019-10-18 | Stop reason: SURG

## 2019-10-18 RX ORDER — SODIUM CHLORIDE, SODIUM LACTATE, POTASSIUM CHLORIDE, CALCIUM CHLORIDE 600; 310; 30; 20 MG/100ML; MG/100ML; MG/100ML; MG/100ML
INJECTION, SOLUTION INTRAVENOUS CONTINUOUS
Status: DISCONTINUED | OUTPATIENT
Start: 2019-10-18 | End: 2019-10-18 | Stop reason: HOSPADM

## 2019-10-18 RX ORDER — SODIUM CHLORIDE, SODIUM LACTATE, POTASSIUM CHLORIDE, CALCIUM CHLORIDE 600; 310; 30; 20 MG/100ML; MG/100ML; MG/100ML; MG/100ML
INJECTION, SOLUTION INTRAVENOUS
Status: DISCONTINUED | OUTPATIENT
Start: 2019-10-18 | End: 2019-10-18 | Stop reason: SURG

## 2019-10-18 RX ORDER — DEXAMETHASONE SODIUM PHOSPHATE 4 MG/ML
INJECTION, SOLUTION INTRA-ARTICULAR; INTRALESIONAL; INTRAMUSCULAR; INTRAVENOUS; SOFT TISSUE PRN
Status: DISCONTINUED | OUTPATIENT
Start: 2019-10-18 | End: 2019-10-18 | Stop reason: SURG

## 2019-10-18 RX ORDER — ONDANSETRON 2 MG/ML
4 INJECTION INTRAMUSCULAR; INTRAVENOUS
Status: DISCONTINUED | OUTPATIENT
Start: 2019-10-18 | End: 2019-10-18 | Stop reason: HOSPADM

## 2019-10-18 RX ORDER — OXYCODONE HCL 5 MG/5 ML
5 SOLUTION, ORAL ORAL
Status: DISCONTINUED | OUTPATIENT
Start: 2019-10-18 | End: 2019-10-18 | Stop reason: HOSPADM

## 2019-10-18 RX ORDER — LABETALOL HYDROCHLORIDE 5 MG/ML
5 INJECTION, SOLUTION INTRAVENOUS
Status: DISCONTINUED | OUTPATIENT
Start: 2019-10-18 | End: 2019-10-18 | Stop reason: HOSPADM

## 2019-10-18 RX ORDER — OXYCODONE HCL 5 MG/5 ML
10 SOLUTION, ORAL ORAL
Status: DISCONTINUED | OUTPATIENT
Start: 2019-10-18 | End: 2019-10-18 | Stop reason: HOSPADM

## 2019-10-18 RX ORDER — MIDAZOLAM HYDROCHLORIDE 1 MG/ML
INJECTION INTRAMUSCULAR; INTRAVENOUS PRN
Status: DISCONTINUED | OUTPATIENT
Start: 2019-10-18 | End: 2019-10-18 | Stop reason: SURG

## 2019-10-18 RX ORDER — HYDROMORPHONE HYDROCHLORIDE 1 MG/ML
0.4 INJECTION, SOLUTION INTRAMUSCULAR; INTRAVENOUS; SUBCUTANEOUS
Status: DISCONTINUED | OUTPATIENT
Start: 2019-10-18 | End: 2019-10-18 | Stop reason: HOSPADM

## 2019-10-18 RX ADMIN — CEFAZOLIN 2 G: 330 INJECTION, POWDER, FOR SOLUTION INTRAMUSCULAR; INTRAVENOUS at 16:18

## 2019-10-18 RX ADMIN — LEVOTHYROXINE SODIUM 112 MCG: 112 TABLET ORAL at 05:21

## 2019-10-18 RX ADMIN — DEXAMETHASONE SODIUM PHOSPHATE 8 MG: 4 INJECTION, SOLUTION INTRA-ARTICULAR; INTRALESIONAL; INTRAMUSCULAR; INTRAVENOUS; SOFT TISSUE at 16:30

## 2019-10-18 RX ADMIN — SODIUM CHLORIDE, POTASSIUM CHLORIDE, SODIUM LACTATE AND CALCIUM CHLORIDE: 600; 310; 30; 20 INJECTION, SOLUTION INTRAVENOUS at 16:13

## 2019-10-18 RX ADMIN — LIDOCAINE HYDROCHLORIDE 40 MG: 20 INJECTION, SOLUTION EPIDURAL; INFILTRATION; INTRACAUDAL at 16:18

## 2019-10-18 RX ADMIN — OXYCODONE HYDROCHLORIDE 5 MG: 5 TABLET ORAL at 00:30

## 2019-10-18 RX ADMIN — SODIUM CHLORIDE: 9 INJECTION, SOLUTION INTRAVENOUS at 05:21

## 2019-10-18 RX ADMIN — Medication 50 MCG: at 16:33

## 2019-10-18 RX ADMIN — ONDANSETRON 4 MG: 2 INJECTION INTRAMUSCULAR; INTRAVENOUS at 16:30

## 2019-10-18 RX ADMIN — SENNOSIDES, DOCUSATE SODIUM 2 TABLET: 50; 8.6 TABLET, FILM COATED ORAL at 05:24

## 2019-10-18 RX ADMIN — Medication 50 MCG: at 16:55

## 2019-10-18 RX ADMIN — GABAPENTIN 300 MG: 300 CAPSULE ORAL at 09:26

## 2019-10-18 RX ADMIN — ROCURONIUM BROMIDE 40 MG: 10 INJECTION, SOLUTION INTRAVENOUS at 16:18

## 2019-10-18 RX ADMIN — FENTANYL CITRATE 50 MCG: 50 INJECTION, SOLUTION INTRAMUSCULAR; INTRAVENOUS at 16:18

## 2019-10-18 RX ADMIN — SUGAMMADEX 150 MG: 100 INJECTION, SOLUTION INTRAVENOUS at 18:31

## 2019-10-18 RX ADMIN — EPHEDRINE SULFATE 10 MG: 50 INJECTION, SOLUTION INTRAVENOUS at 16:51

## 2019-10-18 RX ADMIN — OXYCODONE HYDROCHLORIDE 5 MG: 5 TABLET ORAL at 05:21

## 2019-10-18 RX ADMIN — LABETALOL HYDROCHLORIDE 10 MG: 5 INJECTION INTRAVENOUS at 00:31

## 2019-10-18 RX ADMIN — MIDAZOLAM 2 MG: 1 INJECTION INTRAMUSCULAR; INTRAVENOUS at 16:13

## 2019-10-18 RX ADMIN — Medication 50 MCG: at 16:47

## 2019-10-18 RX ADMIN — FENTANYL CITRATE 25 MCG: 50 INJECTION, SOLUTION INTRAMUSCULAR; INTRAVENOUS at 18:20

## 2019-10-18 RX ADMIN — PROPOFOL 100 MG: 10 INJECTION, EMULSION INTRAVENOUS at 16:18

## 2019-10-18 RX ADMIN — Medication: at 21:27

## 2019-10-18 RX ADMIN — KETOROLAC TROMETHAMINE 15 MG: 30 INJECTION, SOLUTION INTRAMUSCULAR at 21:46

## 2019-10-18 ASSESSMENT — ENCOUNTER SYMPTOMS
BACK PAIN: 1
EYES NEGATIVE: 1
GASTROINTESTINAL NEGATIVE: 1
CARDIOVASCULAR NEGATIVE: 1
CONSTITUTIONAL NEGATIVE: 1
RESPIRATORY NEGATIVE: 1

## 2019-10-18 ASSESSMENT — PAIN SCALES - GENERAL: PAIN_LEVEL: 0

## 2019-10-18 NOTE — ANESTHESIA PREPROCEDURE EVALUATION
Relevant Problems   CARDIAC   (+) Hypertension      ENDO   (+) Hypothyroid       Physical Exam    Airway   Mallampati: II  TM distance: >3 FB  Neck ROM: full       Cardiovascular - normal exam  Rhythm: regular  Rate: normal  (-) murmur     Dental - normal exam         Pulmonary - normal exam  Breath sounds clear to auscultation     Abdominal    Neurological - normal exam                 Anesthesia Plan    ASA 3       Plan - general       Airway plan will be ETT        Induction: intravenous    Postoperative Plan: Postoperative administration of opioids is intended.    Pertinent diagnostic labs and testing reviewed    Informed Consent:    Anesthetic plan and risks discussed with patient.    Use of blood products discussed with: patient whom consented to blood products.

## 2019-10-18 NOTE — PROGRESS NOTES
Hospital Medicine Daily Progress Note    Date of Service  10/18/2019    Chief Complaint  74 y.o. female admitted 10/17/2019 with back pain    Hospital Course    *74 y.o. female who presented 10/17/2019 with history of hypertension dyslipidemia, came with back pain, no trauma no fever no chills administered epidural hematoma, evaluated by neurosurgery,*      Interval Problem Update   -Evaluated and examined at bedside   -Surgery: Lumbar 1-5 laminectomy done today   -Discussed the situation with the family and answered all their questions   -PT eval after surgery    Consultants/Specialty  Neurosurgery    Code Status  Full code    Disposition  To be determined after PT eval    Review of Systems  Review of Systems   Constitutional: Negative.    Eyes: Negative.    Respiratory: Negative.    Cardiovascular: Negative.    Gastrointestinal: Negative.    Genitourinary: Negative.    Musculoskeletal: Positive for back pain.        Physical Exam  Temp:  [36.4 °C (97.6 °F)-37.4 °C (99.3 °F)] 36.6 °C (97.8 °F)  Pulse:  [64-85] 68  Resp:  [12-19] 19  BP: (157-186)/(68-91) 169/82  SpO2:  [93 %-100 %] 98 %    Physical Exam   Constitutional: She is oriented to person, place, and time. She appears well-developed. No distress.   Eyes: No scleral icterus.   Neck: No JVD present.   Cardiovascular: Normal rate.   No murmur heard.  Pulmonary/Chest: No respiratory distress. She has no wheezes. She has no rales.   Abdominal: Soft. She exhibits no distension. There is no tenderness. There is no rebound.   Neurological: She is alert and oriented to person, place, and time. She displays normal reflexes. No cranial nerve deficit. She exhibits normal muscle tone. Coordination normal.   Skin: Skin is warm. No rash noted. No erythema.       Fluids    Intake/Output Summary (Last 24 hours) at 10/18/2019 2030  Last data filed at 10/18/2019 1955  Gross per 24 hour   Intake 2151.25 ml   Output 835 ml   Net 1316.25 ml       Laboratory  Recent Labs      10/17/19  0720 10/18/19  0301   WBC 10.1 8.2   RBC 5.35 4.75   HEMOGLOBIN 14.9 13.7   HEMATOCRIT 46.8 42.0   MCV 87.5 88.4   MCH 27.9 28.8   MCHC 31.8* 32.6*   RDW 43.8 44.1   PLATELETCT 224 201   MPV 11.4 11.3     Recent Labs     10/17/19  0720 10/18/19  0301   SODIUM 139 141   POTASSIUM 3.5* 4.0   CHLORIDE 103 106   CO2 24 27   GLUCOSE 146* 121*   BUN 16 17   CREATININE 0.75 0.88   CALCIUM 9.7 9.4     Recent Labs     10/17/19  0720   APTT 29.8   INR 0.94               Imaging  DX-SPINE-ANY ONE VIEW   Final Result      Intraoperative images as described.      MR-LUMBAR SPINE-WITH   Final Result         1.  Large acute dorsal epidural hematoma causing marked compression of the thecal sac from the T12 level to the sacrum.      2.  No evidence of abnormal intradural or extradural enhancement in the lumbar region.      MR-LUMBAR SPINE-W/O   Final Result         1.  EXTENSIVE LARGE ACUTE DORSAL EPIDURAL HEMATOMA causing marked compression of the posterior aspect of the thecal sac from the T11 level to the sacrum.      These findings were discussed with Dr LADY WELSH at 11:10 AM 10/17/2019.      DX-LUMBAR SPINE-2 OR 3 VIEWS   Final Result         1.  No acute findings.      2.  Mild multilevel degenerative disc disease.      3.  Severe facet arthropathy in the lower lumbar spine.      4.  Minimal retrolisthesis at L2-3.      5.  Minimal anterolisthesis at L4-5 and L5-S1.      DX-PORTABLE FLUORO > 1 HOUR    (Results Pending)        Assessment/Plan  * Epidural hematoma (HCC)  Assessment & Plan  Uncertain etiology  MRI with contrast negative for mass, no history of trauma   No coagulopathy on TEG  Dr. Cruz from neurosurgery>>Lumbar 1-5 laminectomy  done on 10/18  PT OT after surgery for placement      Hypokalemia  Assessment & Plan  Improved   monitor with annual labs tomorrow    IFG (impaired fasting glucose)- (present on admission)  Assessment & Plan  Monitor CBGs  Check HbA1c    Hypothyroid- (present on  admission)  Assessment & Plan  Last TSH was in 2013   continue levothyroxine home dose 112 at this time and reassess with new labs    Hypertension- (present on admission)  Assessment & Plan  Oral BP meds were held on admission to avoid hypotension  Resume her medication after surgery if she is a stable.  Monitoring closely PRN IV labetalol       VTE prophylaxis: SCDs at this time start with pharmacological DVT prophylaxis after clearance from neurosurgery.

## 2019-10-18 NOTE — PROGRESS NOTES
Pt aaox4. BROWNE, RLE weaker 4/5. Numbness to RLE. Up w/ x1 assist with FWW. Pt c/o back/RLE pain, Oxy given. Diet ordered. Voiding w/o difficulty. Reviewed poc with pt-verbalized understanding. Call light in reach.

## 2019-10-18 NOTE — PROGRESS NOTES
0800 Family at bedside. Pt refused  and requested that family interpret. Pt primarily Finnish speaking.     1245 Pt was to have surgery at 1300. Called [facility] and pt surgery is pushed back d/t Dr. Cruz in another case. Likely pushed back 2 hours. Family and patient notified.     1425 Pt voided and taken to surgery. Family at bedside.

## 2019-10-18 NOTE — CARE PLAN
Problem: Communication  Goal: The ability to communicate needs accurately and effectively will improve  Outcome: PROGRESSING AS EXPECTED  Intervention: [place] patient and significant other/support system to call light to alert staff of needs  Flowsheets (Taken 10/18/2019 1414)  Oriented to:: All of the Following : Location of Bathroom, Visiting Policy, Unit Routine, Call Light and Bedside Controls, Bedside Rail Policy, Smoking Policy, Rights and Responsibilities, Bedside Report, and Patient Education Notebook  Note:   Plan of care discussed with patient and family. Pt refusing [place] interpretor and is using family. Pt offered interpretor but still refusing at this time. Plan for surgery today.      Problem: Pain Management  Goal: Pain level will decrease to patient's comfort goal  Outcome: PROGRESSING AS EXPECTED  Note:   Patient denies pain at this time.      Problem: Mobility  Goal: Risk for activity intolerance will decrease  Outcome: PROGRESSING SLOWER THAN EXPECTED  Note:   Pt ambulating with one assist and walker. Gait unsteady. Pt calls appropriately and doesn't attempt to get out of bed without assistance.

## 2019-10-18 NOTE — CARE PLAN
Problem: Communication  Goal: The ability to communicate needs accurately and effectively will improve  Outcome: PROGRESSING AS EXPECTED     Problem: Safety  Goal: Will remain free from injury  Outcome: PROGRESSING AS EXPECTED  Goal: Will remain free from falls  Outcome: PROGRESSING AS EXPECTED     Problem: Infection  Goal: Will remain free from infection  Outcome: PROGRESSING AS EXPECTED     Problem: Venous Thromboembolism (VTW)/Deep Vein Thrombosis (DVT) Prevention:  Goal: Patient will participate in Venous Thrombosis (VTE)/Deep Vein Thrombosis (DVT)Prevention Measures  Outcome: PROGRESSING AS EXPECTED     Problem: Bowel/Gastric:  Goal: Normal bowel function is maintained or improved  Outcome: PROGRESSING AS EXPECTED  Goal: Will not experience complications related to bowel motility  Outcome: PROGRESSING AS EXPECTED     Problem: Knowledge Deficit  Goal: Knowledge of disease process/condition, treatment plan, diagnostic tests, and medications will improve  Outcome: PROGRESSING AS EXPECTED  Goal: Knowledge of the prescribed therapeutic regimen will improve  Outcome: PROGRESSING AS EXPECTED     Problem: Discharge Barriers/Planning  Goal: Patient's continuum of care needs will be met  Outcome: PROGRESSING AS EXPECTED     Problem: Pain Management  Goal: Pain level will decrease to patient's comfort goal  Outcome: PROGRESSING AS EXPECTED

## 2019-10-18 NOTE — PROGRESS NOTES
RN MOBILITY NOTE     Surgery patient?: ye  Date of surgery: 10/18/19  Ambulated 50 ft on day of surgery? (N/A if today is not date of surgery): not back from surgery yet  Number of times ambulated 50 feet or greater today: 3  Patient has been up to chair, edge of bed or HOB 90 degrees for all meals?: yes  Goal met? (goal is ambulating at least 50 feet 2 times on day shift, one time on night shift): yes  If patient did not meet mobility goal, why?: n/a

## 2019-10-18 NOTE — PROGRESS NOTES
Bedside report received.  Assessment complete.  A&O x 4. Patient calls appropriately.  Patient ambulates with 1 assist and fww. .   Patient has 0-5/10 pain. Prn meds given  Denies N&V. Tolerating diet. NPO @ midnight.  + void, + flatus, 10-16 last BM.  Patient denies SOB...  Surgery today, CHG bath given @0530  Review plan with of care with patient. Call light and personal belongings with in reach. Hourly rounding in place. All needs met at this time.

## 2019-10-18 NOTE — CONSULTS
Neurosurgery Consultation  Reason for referral:  Lumbar epidural hematoma     Date of Service  10/17/2019 1700     Referring Physician  Susi Grant M.D.      Code Status  Full     Chief Complaint  Back pain     History of Presenting Illness  74 y.o. female with low back pain, right leg weakness and numbness beginning this morning.  She was trying to go to the bathroom she noted weakness in her right leg so she had to crawl back to bed.  EMS was called and she was taken to the emergency room for further evaluation.  She is complaining of low back pain, moderately sharp, radiating down her right lower extremity and right buttock associated with numbness in her right calf and her right foot.  No perineal numbness no change in urine or bowel habits.  No falls or trauma.  She occasionally takes low-dose aspirin,  she took one yesterday.  She denies any other antiplatelet or anticoagulant.  She denies spine injections or trauma/falls to her back.     Review of Systems  Review of Systems   All other systems reviewed and are negative.        Past Medical History   has a past medical history of ASTHMA, Breast mass, Dyslipidemia, Heart attack (HCC), HTN, Post-menopausal, and Thyroid disease.     Surgical History  Negative per patient     Family History  family history includes Heart Disease in her father.      Social History   reports that she has never smoked. She has never used smokeless tobacco. She reports that she does not drink alcohol or use drugs.     Allergies        Allergies   Allergen Reactions   • Pcn [Penicillins] Rash   • Tylenol [Acetaminophen]         Dizziness          Medications  Prior to Admission Medications   Prescriptions Last Dose Informant Patient Reported? Taking?   levothyroxine (SYNTHROID) 112 MCG Tab 10/16/2019 at unknown Patient's Home Pharmacy Yes Yes   Sig: Take 112 mcg by mouth Every morning on an empty stomach.   metoprolol (LOPRESSOR) 100 MG TABS 10/16/2019 at unknown Patient's Home  Pharmacy No No   Sig: Take 1 Tab by mouth 2 times a day.   valsartan (DIOVAN) 320 MG tablet 10/16/2019 at unknown Patient's Home Pharmacy No No   Sig: Take 1 Tab by mouth every day.      Facility-Administered Medications: None         Physical Exam  Temp:  [35.9 °C (96.6 °F)] 35.9 °C (96.6 °F)  Pulse:  [58-75] 68  Resp:  [16] 16  BP: (130-215)/() 156/76  SpO2:  [94 %-98 %] 97 %     Physical Exam   Constitutional: She is oriented to person, place, and time. She appears well-developed and well-nourished.   HENT:   Head: Normocephalic and atraumatic.   Eyes: Conjunctivae are normal.  Neck: Neck supple.   Musculoskeletal:good range of motion  Neurological:   Awake, alert   Speech fluent appropriate  In no apparent distress  Affect, mood appropriate  Oriented x 3  Pupils 3 mm midline, reactive.  Conjugate gaze.  Visual fields full to confrontation  Face symmetric  Tongue midline without fasciculation  Facial sensation intact light touch  Hearing intact to conversation, light finger rub bilaterally  Motor:  Bilateral SCM, shoulder shrug, deltoid, bicep, tricep, , wrist extension, hand intrinsics                IP, thigh adduction, thigh abduction, quadriceps, hamstrings,                 Plantar flexion, foot inversion, foot eversion, EHL 5/5 no pronator drift  Right dorsiflexion 0/5  Sensation:  Decreased touch right leg worse knee distally  Reflex:  No Camejo's no clonus  Finger-nose-finger, GABRIELA unremarkable    Skin: Skin is warm and dry.   Psychiatric: She has a normal mood and affect. Her behavior is normal.       Laboratory:      Recent Labs     10/17/19  0720   WBC 10.1   RBC 5.35   HEMOGLOBIN 14.9   HEMATOCRIT 46.8   MCV 87.5   MCH 27.9   MCHC 31.8*   RDW 43.8   PLATELETCT 224   MPV 11.4          Recent Labs     10/17/19  0720   SODIUM 139   POTASSIUM 3.5*   CHLORIDE 103   CO2 24   GLUCOSE 146*   BUN 16   CREATININE 0.75   CALCIUM 9.7          Recent Labs     10/17/19  0720   GLUCOSE 146*          Recent  Labs     10/17/19  0720   APTT 29.8   INR 0.94      No results for input(s): NTPROBNP in the last 72 hours.      No results for input(s): TROPONINT in the last 72 hours.     Urinalysis:    No results found      Imaging:  MR-LUMBAR SPINE-WITH   Final Result           1.  Large acute dorsal epidural hematoma causing marked compression of the thecal sac from the T12 level to the sacrum.       2.  No evidence of abnormal intradural or extradural enhancement in the lumbar region.       MR-LUMBAR SPINE-W/O   Final Result           1.  EXTENSIVE LARGE ACUTE DORSAL EPIDURAL HEMATOMA causing marked compression of the posterior aspect of the thecal sac from the T11 level to the sacrum.       These findings were discussed with Dr LADY WELSH at 11:10 AM 10/17/2019.       DX-LUMBAR SPINE-2 OR 3 VIEWS   Final Result           1.  No acute findings.       2.  Mild multilevel degenerative disc disease.       3.  Severe facet arthropathy in the lower lumbar spine.       4.  Minimal retrolisthesis at L2-3.       5.  Minimal anterolisthesis at L4-5 and L5-S1.          Assessment/Plan:  74 year old female with right leg weakness, sensory disturbance; MRI demonstrates extensive lumbothoracic epidural hematoma extending from T11 to S1.  No enhancement is noted.  Given symptoms, lumbar laminectomy with evacuation of the hematoma is planned.  The procedure was discussed with her and her family.  Risks including but not limited to infection, bleeding, spinal fluid leak, nerve root injury resulting in leg pain, numbness, weakness, loss of bowel/bladder function, persistent symptoms despite technically adequate procedure were discussed.  She and her familly expressed understanding of these issues.  Surgery is scheduled for the morning.

## 2019-10-18 NOTE — PROGRESS NOTES
Neurosurgery Progress Note    Subjective:  Pain controlled  NPO    Exam:  A&O, speaks some english, family at bedside to help with translation  Sensation diminished in right medial knee/calf  Right DF 0/5, right IP 4+. Remaining lower extremity strength 5/5    BP  Min: 130/60  Max: 186/85  Pulse  Av.3  Min: 64  Max: 75  Resp  Av  Min: 16  Max: 16  Temp  Av.3 °C (99.2 °F)  Min: 37.2 °C (98.9 °F)  Max: 37.5 °C (99.5 °F)  SpO2  Av.8 %  Min: 93 %  Max: 98 %    No data recorded    Recent Labs     10/17/19  0720 10/18/19  0301   WBC 10.1 8.2   RBC 5.35 4.75   HEMOGLOBIN 14.9 13.7   HEMATOCRIT 46.8 42.0   MCV 87.5 88.4   MCH 27.9 28.8   MCHC 31.8* 32.6*   RDW 43.8 44.1   PLATELETCT 224 201   MPV 11.4 11.3     Recent Labs     10/17/19  0720 10/18/19  0301   SODIUM 139 141   POTASSIUM 3.5* 4.0   CHLORIDE 103 106   CO2 24 27   GLUCOSE 146* 121*   BUN 16 17   CREATININE 0.75 0.88   CALCIUM 9.7 9.4     Recent Labs     10/17/19  0720   APTT 29.8   INR 0.94     Recent Labs     10/17/19  1305   REACTMIN 6.5   CLOTKINET 2.2   CLOTANGL 59.9   MAXCLOTS 65.8   [id number] 0.0   PRCINADP 1.6   PRCINAA 40.8       Intake/Output       10/17/19 0700 - 10/18/19 0659 10/18/19 0700 - 10/19/19 0659       Total  Total       Intake    P.O.  500  -- 500  --  -- --    P.O. 500 -- 500 -- -- --    I.V.  112  851.3 963.3  --  -- --    Volume (mL) (NS infusion) 112 851.3 963.3 -- -- --    Total Intake 612 851.3 1463.3 -- -- --       Output    Urine  --  -- --  --  -- --    Number of Times Voided 1 x 3 x 4 x -- -- --    Total Output -- -- -- -- -- --       Net I/O     612 851.3 1463.3 -- -- --            Intake/Output Summary (Last 24 hours) at 10/18/2019 0820  Last data filed at 10/18/2019 0521  Gross per 24 hour   Intake 1463.25 ml   Output --   Net 1463.25 ml            • levothyroxine  112 mcg AM ES   • senna-docusate  2 Tab BID    And   • polyethylene glycol/lytes  1 Packet QDAY PRN    And   •  magnesium hydroxide  30 mL QDAY PRN    And   • bisacodyl  10 mg QDAY PRN   • NS   Continuous   • Pharmacy Consult Request  1 Each PHARMACY TO DOSE    And   • oxyCODONE immediate-release  2.5 mg Q3HRS PRN    And   • oxyCODONE immediate-release  5 mg Q3HRS PRN    And   • HYDROmorphone  0.25 mg Q3HRS PRN   • ondansetron  4 mg Q4HRS PRN   • ondansetron  4 mg Q4HRS PRN   • labetalol  10 mg Q4HRS PRN       Assessment and Plan:  Hospital day #2  POD #0  Prophylactic anticoagulation: no         Start date/time: tbd  Surgery this afternoon  Pre-op EKG ordered  Labs & meds reviewed  Pt's questions answered to her apparent satisfaction. Pt would like to proceed with surgery

## 2019-10-18 NOTE — OP REPORT
NEUROSURGERY OPERATIVE NOTE  DATE:  4:19 PM 10/18/2019    PATIENT NAME:  Desiree Salas   1945 MRN [id number]      PROCEDURE:  1.  Lumbar 1-5 laminectomy  2.  bilaterlal Lumbar 1/2-5/Sacral 1 foraminotomy    Surgeon:  Jose Cruz MD, PhD  Assistant:  SUMA Mijares    Anesthesia:  GETA    Diagnosis:  Lumbar epidural hematoma    Indication:  74 year old female with acute lumbar epidural hematoma extending from T12-L5.  She has sensory deficits in her right leg, right foot drop and subjective right left weakness.  Decompressive laminectomy/foraminotomy is planned.      Procedure:  The patient was identified in the holding area, and the surgery site marked, consent was obtained.  The patient was brought back to the operating room and intubated by anesthesia service.  2 grams Ancef was administered intravenously.  She was transferred to the operating room table in a prone manner and all pressure points well padded.  Their lumbar region was prepped with hair clipping, chlorhexidine and betadine scrub, and Chloroprep.  Sterile drapes were applied including a layer of Ioban.  The correct vertebral levels were identified flouroscopically. The planned midline incision was infiltrated with 0.5% Marcaine/epinephrine.  The skin was incised sharply and dissection carried deeply using monopolar cautery.  The lumbar 1, 2, 3, 4, 5 lamina were exposed; this was verified flouroscopically.  Troughs were created along the lateral aspect of the  1, 2, 3, 4, 5 lamina using the high speed drill with the M8 bit.  The  1, 2, 3, 4, 5 lamina/spinous processes were removed en-bloc.  Coagulated hematoma was noted with significant compression of the thecal sac.  This was very extensive, extending along the entire laminectomy site.  The hematoma was evacuated using gentle suction and curved curettes.  Good evacuation with decompression of the thecal sac was obtained.  Bilateral lumbar 1/2, 2/3, 3/4, 4/5, 5/sacral  1 neuroforaminotomies were performed using Kerrison rongeurs.  The neuroforamen were explored with a Dandy nerve hook; good decompression was noted.  Good hemostasis was noted.  SurgiFlow was placed along the lateral aspects of the exposed dura, and Gelfoam placed over the dura.  A medium Hemovac drain was placed and brought out through a separate incision.  The drain was secured to the skin using 2-0 Nylon suture.  The paraspinous muscle was infiltrated with 0.5% Marcaine.  The lumbar fascia and muscle were reapproximated with 1-0 Vicryl suture in separate layers, in an interrupted manner.  The dermis was reapproximated with 3-0 Vicryl suture in an inverted interrupted manner.  The skin was reapproximated with staples.  A silver containing dressing was applied.  Final counts were correct.    FINDINGS: Extensive, thick lumbar epidural hematoma extending from L1-L5, good decompression.  No bleeding source identified.  Good hemostasis noted.  SPECIMEN:  None  DRAINS:medium hemovac  EBL:  50 CC  COMPLICATIONS:  None apparent at end of procedure.

## 2019-10-19 LAB
ANION GAP SERPL CALC-SCNC: 3 MMOL/L (ref 0–11.9)
BASOPHILS # BLD AUTO: 0.1 % (ref 0–1.8)
BASOPHILS # BLD: 0.01 K/UL (ref 0–0.12)
BUN SERPL-MCNC: 17 MG/DL (ref 8–22)
CALCIUM SERPL-MCNC: 8.9 MG/DL (ref 8.5–10.5)
CHLORIDE SERPL-SCNC: 108 MMOL/L (ref 96–112)
CO2 SERPL-SCNC: 29 MMOL/L (ref 20–33)
CREAT SERPL-MCNC: 0.86 MG/DL (ref 0.5–1.4)
EOSINOPHIL # BLD AUTO: 0 K/UL (ref 0–0.51)
EOSINOPHIL NFR BLD: 0 % (ref 0–6.9)
ERYTHROCYTE [DISTWIDTH] IN BLOOD BY AUTOMATED COUNT: 46.4 FL (ref 35.9–50)
GLUCOSE SERPL-MCNC: 121 MG/DL (ref 65–99)
HCT VFR BLD AUTO: 38.4 % (ref 37–47)
HGB BLD-MCNC: 12.2 G/DL (ref 12–16)
IMM GRANULOCYTES # BLD AUTO: 0.04 K/UL (ref 0–0.11)
IMM GRANULOCYTES NFR BLD AUTO: 0.4 % (ref 0–0.9)
LYMPHOCYTES # BLD AUTO: 1.03 K/UL (ref 1–4.8)
LYMPHOCYTES NFR BLD: 10.4 % (ref 22–41)
MCH RBC QN AUTO: 28.8 PG (ref 27–33)
MCHC RBC AUTO-ENTMCNC: 31.8 G/DL (ref 33.6–35)
MCV RBC AUTO: 90.6 FL (ref 81.4–97.8)
MONOCYTES # BLD AUTO: 0.43 K/UL (ref 0–0.85)
MONOCYTES NFR BLD AUTO: 4.3 % (ref 0–13.4)
NEUTROPHILS # BLD AUTO: 8.38 K/UL (ref 2–7.15)
NEUTROPHILS NFR BLD: 84.8 % (ref 44–72)
NRBC # BLD AUTO: 0 K/UL
NRBC BLD-RTO: 0 /100 WBC
PLATELET # BLD AUTO: 187 K/UL (ref 164–446)
PMV BLD AUTO: 11.6 FL (ref 9–12.9)
POTASSIUM SERPL-SCNC: 4.6 MMOL/L (ref 3.6–5.5)
RBC # BLD AUTO: 4.24 M/UL (ref 4.2–5.4)
SODIUM SERPL-SCNC: 140 MMOL/L (ref 135–145)
WBC # BLD AUTO: 9.9 K/UL (ref 4.8–10.8)

## 2019-10-19 PROCEDURE — 85025 COMPLETE CBC W/AUTO DIFF WBC: CPT

## 2019-10-19 PROCEDURE — 80048 BASIC METABOLIC PNL TOTAL CA: CPT

## 2019-10-19 PROCEDURE — 770001 HCHG ROOM/CARE - MED/SURG/GYN PRIV*

## 2019-10-19 PROCEDURE — A9270 NON-COVERED ITEM OR SERVICE: HCPCS | Performed by: HOSPITALIST

## 2019-10-19 PROCEDURE — 700102 HCHG RX REV CODE 250 W/ 637 OVERRIDE(OP): Performed by: HOSPITALIST

## 2019-10-19 PROCEDURE — 700111 HCHG RX REV CODE 636 W/ 250 OVERRIDE (IP): Performed by: INTERNAL MEDICINE

## 2019-10-19 PROCEDURE — 36415 COLL VENOUS BLD VENIPUNCTURE: CPT

## 2019-10-19 PROCEDURE — 99232 SBSQ HOSP IP/OBS MODERATE 35: CPT | Performed by: INTERNAL MEDICINE

## 2019-10-19 PROCEDURE — 700111 HCHG RX REV CODE 636 W/ 250 OVERRIDE (IP): Performed by: NEUROLOGICAL SURGERY

## 2019-10-19 PROCEDURE — 700102 HCHG RX REV CODE 250 W/ 637 OVERRIDE(OP): Performed by: NEUROLOGICAL SURGERY

## 2019-10-19 PROCEDURE — A9270 NON-COVERED ITEM OR SERVICE: HCPCS | Performed by: NEUROLOGICAL SURGERY

## 2019-10-19 PROCEDURE — 700105 HCHG RX REV CODE 258: Performed by: HOSPITALIST

## 2019-10-19 RX ORDER — CYCLOBENZAPRINE HCL 10 MG
10 TABLET ORAL 3 TIMES DAILY
Status: DISCONTINUED | OUTPATIENT
Start: 2019-10-19 | End: 2019-10-22 | Stop reason: HOSPADM

## 2019-10-19 RX ORDER — OMEPRAZOLE 20 MG/1
40 CAPSULE, DELAYED RELEASE ORAL DAILY
Status: DISCONTINUED | OUTPATIENT
Start: 2019-10-19 | End: 2019-10-22 | Stop reason: HOSPADM

## 2019-10-19 RX ORDER — HYDRALAZINE HYDROCHLORIDE 10 MG/1
5 TABLET, FILM COATED ORAL EVERY 6 HOURS PRN
Status: DISCONTINUED | OUTPATIENT
Start: 2019-10-19 | End: 2019-10-22 | Stop reason: HOSPADM

## 2019-10-19 RX ORDER — MORPHINE SULFATE 4 MG/ML
2 INJECTION, SOLUTION INTRAMUSCULAR; INTRAVENOUS
Status: DISCONTINUED | OUTPATIENT
Start: 2019-10-19 | End: 2019-10-20

## 2019-10-19 RX ORDER — KETOROLAC TROMETHAMINE 30 MG/ML
15 INJECTION, SOLUTION INTRAMUSCULAR; INTRAVENOUS EVERY 6 HOURS PRN
Status: DISCONTINUED | OUTPATIENT
Start: 2019-10-19 | End: 2019-10-20

## 2019-10-19 RX ORDER — CEFAZOLIN SODIUM 2 G/100ML
2 INJECTION, SOLUTION INTRAVENOUS EVERY 8 HOURS
Status: COMPLETED | OUTPATIENT
Start: 2019-10-19 | End: 2019-10-19

## 2019-10-19 RX ADMIN — SENNOSIDES, DOCUSATE SODIUM 2 TABLET: 50; 8.6 TABLET, FILM COATED ORAL at 17:10

## 2019-10-19 RX ADMIN — CEFAZOLIN SODIUM 2 G: 2 INJECTION, SOLUTION INTRAVENOUS at 14:54

## 2019-10-19 RX ADMIN — CEFAZOLIN SODIUM 2 G: 2 INJECTION, SOLUTION INTRAVENOUS at 08:26

## 2019-10-19 RX ADMIN — KETOROLAC TROMETHAMINE 15 MG: 30 INJECTION, SOLUTION INTRAMUSCULAR at 11:54

## 2019-10-19 RX ADMIN — CEFAZOLIN SODIUM 2 G: 2 INJECTION, SOLUTION INTRAVENOUS at 21:05

## 2019-10-19 RX ADMIN — HYDRALAZINE HYDROCHLORIDE 5 MG: 10 TABLET, FILM COATED ORAL at 21:05

## 2019-10-19 RX ADMIN — CYCLOBENZAPRINE 10 MG: 10 TABLET, FILM COATED ORAL at 08:30

## 2019-10-19 RX ADMIN — CYCLOBENZAPRINE 10 MG: 10 TABLET, FILM COATED ORAL at 11:53

## 2019-10-19 RX ADMIN — LEVOTHYROXINE SODIUM 112 MCG: 112 TABLET ORAL at 05:41

## 2019-10-19 RX ADMIN — SODIUM CHLORIDE: 9 INJECTION, SOLUTION INTRAVENOUS at 03:08

## 2019-10-19 RX ADMIN — SENNOSIDES, DOCUSATE SODIUM 2 TABLET: 50; 8.6 TABLET, FILM COATED ORAL at 05:41

## 2019-10-19 RX ADMIN — CYCLOBENZAPRINE 10 MG: 10 TABLET, FILM COATED ORAL at 17:10

## 2019-10-19 RX ADMIN — OXYCODONE HYDROCHLORIDE 2.5 MG: 5 TABLET ORAL at 19:17

## 2019-10-19 RX ADMIN — KETOROLAC TROMETHAMINE 15 MG: 30 INJECTION, SOLUTION INTRAMUSCULAR at 05:41

## 2019-10-19 ASSESSMENT — ENCOUNTER SYMPTOMS
BACK PAIN: 1
RESPIRATORY NEGATIVE: 1
CONSTITUTIONAL NEGATIVE: 1
CARDIOVASCULAR NEGATIVE: 1
EYES NEGATIVE: 1
GASTROINTESTINAL NEGATIVE: 1

## 2019-10-19 NOTE — ANESTHESIA PROCEDURE NOTES
Peripheral IV  Date/Time: 10/18/2019 5:20 PM  Performed by: Shalom Mallory M.D.  Authorized by: Shalom Mallory M.D.     Size:  18 G  Laterality:  Right  Site Prep:  Alcohol  Technique:  Direct puncture  Attempts:  1

## 2019-10-19 NOTE — ANESTHESIA TIME REPORT
Anesthesia Start and Stop Event Times     Date Time Event    10/18/2019 1549 Ready for Procedure     1613 Anesthesia Start     1846 Anesthesia Stop        Responsible Staff  10/18/19    Name Role Begin End    Shalom Mallory M.D. Anesth 1613 1846        Preop Diagnosis (Free Text):  Pre-op Diagnosis      lumbothoracic epidural hematoma         Preop Diagnosis (Codes):    Post op Diagnosis  Epidural hematoma (HCC)      Premium Reason  A. 3PM - 7AM    Comments:

## 2019-10-19 NOTE — ANESTHESIA PROCEDURE NOTES
Airway  Date/Time: 10/18/2019 4:19 PM  Performed by: Shalom Mallory M.D.  Authorized by: Shalom Mallory M.D.     Location:  OR  Urgency:  Elective  Indications for Airway Management:  Anesthesia  Spontaneous Ventilation: absent    Sedation Level:  Deep  Preoxygenated: Yes    Patient Position:  Sniffing  Final Airway Type:  Endotracheal airway  Final Endotracheal Airway:  ETT  Cuffed: Yes    Technique Used for Successful ETT Placement:  Direct laryngoscopy  Insertion Site:  Oral  Blade Type:  [place]  Laryngoscope Blade/Videolaryngoscope Blade Size:  3  ETT Size (mm):  6.5  Measured from:  Teeth  ETT to Teeth (cm):  21  Placement Verified by: auscultation and capnometry    Cormack-Lehane Classification:  Grade I - full view of glottis  Number of Attempts at Approach:  1

## 2019-10-19 NOTE — PROGRESS NOTES
Neurosurgery Progress Note    Subjective:  Pt awake, alert  Family at bedside to help with translation  Pain tolerable   Feels n/t rt leg, improved since surgery  Improved motor function since surgery as well   Correa     Exam:  LE str 5/5 except right TA 3-4/5  Inc c/d/i with hvac      BP  Min: 100/58  Max: 169/82  Pulse  Av.5  Min: 66  Max: 92  Resp  Av.2  Min: 13  Max: 19  Temp  Av.5 °C (97.7 °F)  Min: 36.1 °C (97 °F)  Max: 37 °C (98.6 °F)  SpO2  Av.8 %  Min: 94 %  Max: 100 %    No data recorded    Recent Labs     10/17/19  0720 10/18/19  0301 10/19/19  0451   WBC 10.1 8.2 9.9   RBC 5.35 4.75 4.24   HEMOGLOBIN 14.9 13.7 12.2   HEMATOCRIT 46.8 42.0 38.4   MCV 87.5 88.4 90.6   MCH 27.9 28.8 28.8   MCHC 31.8* 32.6* 31.8*   RDW 43.8 44.1 46.4   PLATELETCT 224 201 187   MPV 11.4 11.3 11.6     Recent Labs     10/17/19  0720 10/18/19  0301 10/19/19  0451   SODIUM 139 141 140   POTASSIUM 3.5* 4.0 4.6   CHLORIDE 103 106 108   CO2 24 27 29   GLUCOSE 146* 121* 121*   BUN 16 17 17   CREATININE 0.75 0.88 0.86   CALCIUM 9.7 9.4 8.9     Recent Labs     10/17/19  0720   APTT 29.8   INR 0.94     Recent Labs     10/17/19  1305   REACTMIN 6.5   CLOTKINET 2.2   CLOTANGL 59.9   MAXCLOTS 65.8   [id number] 0.0   PRCINADP 1.6   PRCINAA 40.8       Intake/Output       10/18/19 0700 - 10/19/19 0659 10/19/19 0700 - 10/20/19 0659      1900-0659 Total 1900-0659 Total       Intake    I.V.  1300  -- 1300  6  -- 6    PCA End of Shift Total Volume (ml) -- -- -- 6 -- 6    Volume (mL) (Lactated Ringers) 1300 -- 1300 -- -- --    Total Intake 1300 -- 1300 6 -- 6       Output    Urine  350  800 1150  --  -- --    Urine 350 -- 350 -- -- --    Number of Times Voided 2 x -- 2 x -- -- --    Output (mL) (Urethral Catheter Latex 16 Fr.) -- 800 800 -- -- --    Drains  --  180 180  --  -- --    Output (mL) (Closed/Suction Drain 1 Posterior Back Hemovac) -- 180 180 -- -- --    Blood  35  -- 35  --  -- --    Est. Blood Loss  35 -- 35 -- -- --    Total Output  -- -- --       Net I/O     915 -980 -65 6 -- 6            Intake/Output Summary (Last 24 hours) at 10/19/2019 0933  Last data filed at 10/19/2019 0702  Gross per 24 hour   Intake 1306 ml   Output 1365 ml   Net -59 ml            • ceFAZolin  2 g Q8HRS   • cyclobenzaprine  10 mg TID   • Pharmacy Consult Request  1 Each PHARMACY TO DOSE   • acetaminophen  650 mg Q6HRS PRN   • ketorolac  15 mg Q6HRS   • morphine   Continuous   • levothyroxine  112 mcg AM ES   • senna-docusate  2 Tab BID    And   • polyethylene glycol/lytes  1 Packet QDAY PRN    And   • magnesium hydroxide  30 mL QDAY PRN    And   • bisacodyl  10 mg QDAY PRN   • NS   Continuous   • Pharmacy Consult Request  1 Each PHARMACY TO DOSE    And   • oxyCODONE immediate-release  2.5 mg Q3HRS PRN    And   • oxyCODONE immediate-release  5 mg Q3HRS PRN    And   • HYDROmorphone  0.25 mg Q3HRS PRN   • ondansetron  4 mg Q4HRS PRN   • ondansetron  4 mg Q4HRS PRN   • labetalol  10 mg Q4HRS PRN     HVAC 180 cc since OR, 130 cc/ past 8 hrs    Assessment and Plan:  Hospital day # 3  POD # 2 L1-5 lami for EDH  Prophylactic anticoagulation: no           Labs stable  PT/OT  Pain control - dc pca start orals  ALBINA glover

## 2019-10-19 NOTE — PROGRESS NOTES
[facility] Medicine Daily Progress Note    Date of Service  10/19/2019    Chief Complaint  74 y.o. female admitted 10/17/2019 with back pain    Hospital Course    *74 y.o. female with history of hypertension dyslipidemia who presented 10/17/2019 acute back pain, no trauma no fever no chills, numbness and some weakness in her lower extremities, MRI showed epidural hematoma, evaluated by neurosurgery, and lumpectomy with evacuation was done on 10/18, the patient felt better after surgery no weakness and her pain has improved.*          Interval Problem Update   -Evaluated and examined at bedside   -Surgery: Lumbar 1-5 laminectomy done yesterday with good resolved and improving on her pain, no weakness in her lower extremities.   -Reviewed her labs.   -PT eval I discussed the placement        Consultants/Specialty  Neurosurgery    Code Status  Full code    Disposition  To be determined after PT eval    Review of Systems  Review of Systems   Constitutional: Negative.    Eyes: Negative.    Respiratory: Negative.    Cardiovascular: Negative.    Gastrointestinal: Negative.    Genitourinary: Negative.    Musculoskeletal: Positive for back pain.        Improved         Physical Exam  Temp:  [36.1 °C (97 °F)-37 °C (98.6 °F)] 36.5 °C (97.7 °F)  Pulse:  [66-92] 66  Resp:  [12-19] 12  BP: (100-169)/(56-91) 114/56  SpO2:  [94 %-100 %] 98 %    Physical Exam   Constitutional: She is oriented to person, place, and time. She appears well-developed. No distress.   Eyes: No scleral icterus.   Neck: No JVD present.   Cardiovascular: Normal rate.   No murmur heard.  Pulmonary/Chest: No respiratory distress. She has no wheezes. She has no rales.   Abdominal: Soft. She exhibits no distension. There is no tenderness. There is no rebound.   Neurological: She is alert and oriented to person, place, and time. She displays normal reflexes. No cranial nerve deficit. She exhibits normal muscle tone. Coordination normal.   Skin: Skin is warm. No  rash noted. No erythema.       Fluids    Intake/Output Summary (Last 24 hours) at 10/19/2019 1107  Last data filed at 10/19/2019 1016  Gross per 24 hour   Intake 1308 ml   Output 1615 ml   Net -307 ml       Laboratory  Recent Labs     10/17/19  0720 10/18/19  0301 10/19/19  0451   WBC 10.1 8.2 9.9   RBC 5.35 4.75 4.24   HEMOGLOBIN 14.9 13.7 12.2   HEMATOCRIT 46.8 42.0 38.4   MCV 87.5 88.4 90.6   MCH 27.9 28.8 28.8   MCHC 31.8* 32.6* 31.8*   RDW 43.8 44.1 46.4   PLATELETCT 224 201 187   MPV 11.4 11.3 11.6     Recent Labs     10/17/19  0720 10/18/19  0301 10/19/19  0451   SODIUM 139 141 140   POTASSIUM 3.5* 4.0 4.6   CHLORIDE 103 106 108   CO2 24 27 29   GLUCOSE 146* 121* 121*   BUN 16 17 17   CREATININE 0.75 0.88 0.86   CALCIUM 9.7 9.4 8.9     Recent Labs     10/17/19  0720   APTT 29.8   INR 0.94               Imaging  DX-SPINE-ANY ONE VIEW   Final Result      Intraoperative images as described.      MR-LUMBAR SPINE-WITH   Final Result         1.  Large acute dorsal epidural hematoma causing marked compression of the thecal sac from the T12 level to the sacrum.      2.  No evidence of abnormal intradural or extradural enhancement in the lumbar region.      MR-LUMBAR SPINE-W/O   Final Result         1.  EXTENSIVE LARGE ACUTE DORSAL EPIDURAL HEMATOMA causing marked compression of the posterior aspect of the thecal sac from the T11 level to the sacrum.      These findings were discussed with Dr LADY WELSH at 11:10 AM 10/17/2019.      DX-LUMBAR SPINE-2 OR 3 VIEWS   Final Result         1.  No acute findings.      2.  Mild multilevel degenerative disc disease.      3.  Severe facet arthropathy in the lower lumbar spine.      4.  Minimal retrolisthesis at L2-3.      5.  Minimal anterolisthesis at L4-5 and L5-S1.      DX-PORTABLE FLUORO > 1 HOUR    (Results Pending)        Assessment/Plan  * Epidural hematoma (HCC)  Assessment & Plan  Uncertain etiology: Denies trauma, TEG study was normal  MRI with contrast negative  for mass but showed huge hematoma, no history of trauma   Dr. Cruz from neurosurgery>>Lumbar 1-5 laminectomy  done on 10/18  Improving after surgery.  No weakness on exam  PT OT after surgery for placement      Hypokalemia  Assessment & Plan  Resolved  Monitoring     Hypothyroid- (present on admission)  Assessment & Plan  Last TSH was in 2013   continue levothyroxine home dose 112 at this time and reassess with new labs    IFG (impaired fasting glucose)- (present on admission)  Assessment & Plan  A1c 5.9  Healthy diet and losing weight consulted    Hypertension- (present on admission)  Assessment & Plan  Oral BP meds were held on admission to avoid hypotension  Resume her medication after surgery if she is a stable  Her blood pressure is controlled without medication we will keep monitoring and resume her medication for losartan 320 daily if needed.  Monitoring closely PRN IV labetalol       VTE prophylaxis: SCDs at this time start with pharmacological DVT prophylaxis after clearance from neurosurgery.

## 2019-10-19 NOTE — OR NURSING
PT from OR w/ RN/ANES, PT A/O x4, mostly [place] speaking but understands and communicates in English.  PT denies pain/nausea, still reports tingling on RLE, R foot drop noted, has +4/5 strength in RLE, +5/5 LLE and upper extremeties.  Dressing is mepilex covering staples, 2x2 w/ tegaderm to hemovac site, CDI; hemovac to clamped suction, 50cc sanguinous drainage noted, glover in place draining clear yellow urine, 400 cc clear yellow urine emptied.  PT on 2L O2, sats > 97%.  PT transported to floor w/ RN.  Family waiting at bedside, updated on status.

## 2019-10-19 NOTE — ANESTHESIA POSTPROCEDURE EVALUATION
Patient: Desiree Salas    Procedure Summary     Date:  10/18/19 Room / Location:  [facility] OR 05 / SURGERY [facility] ORS    Anesthesia Start:  1613 Anesthesia Stop:  1846    Procedure:  LAMINECTOMY, SPINE, LUMBAR, WITH DISCECTOMY- L2-5 (Bilateral Spine Lumbar) Diagnosis:  ( lumbothoracic epidural hematoma )    Surgeon:  Jose Cruz M.D. Responsible Provider:  Shalom Mallory M.D.    Anesthesia Type:  general ASA Status:  3          Final Anesthesia Type: general  Last vitals  BP   Blood Pressure : 157/75    Temp   36.6 °C (97.8 °F)    Pulse   Pulse: 85   Resp   18    SpO2   100 %      Anesthesia Post Evaluation    Patient location during evaluation: PACU  Patient participation: complete - patient participated  Level of consciousness: awake and alert  Pain score: 0    Airway patency: patent  Anesthetic complications: no  Cardiovascular status: hemodynamically stable  Respiratory status: acceptable  Hydration status: euvolemic    PONV: none           Nurse Pain Score: 0 (NPRS)

## 2019-10-19 NOTE — PROGRESS NOTES
Shift Summary [id number]    -Neuro: A&Ox4, primarily Czech speaking  -Dressing post laminectomy CDI; mepilex covering staples. 2x2 tegaderms to hemovac clamped to suction  -Pain controlled post-procedure with morphine PCA; scheduled toradol in use  -+4/5 strength in RLE with foot drop and numbness/tingling; +5 in other extremities  -No complaints of nausea or diarrhea  -Correa catheter remains in place, CDI  -Access: R AC PIV infusing PCA/MIVF; R hand PIV capped  -Activity: has not gotten OOB post procedure. Ax1-2 FWW, BA on, call light in reach, rounding in place, bed in lowest position

## 2019-10-19 NOTE — ANESTHESIA QCDR
2019 [facility] Clinical Data Registry (for Quality Improvement)     Postoperative nausea/vomiting risk protocol (Adult = 18 yrs and Pediatric 3-17 yrs)- (430 and 463)  General inhalation anesthetic (NOT TIVA) with PONV risk factors: Yes  Provision of anti-emetic therapy with at least 2 different classes of agents: Yes   Patient DID NOT receive anti-emetic therapy and reason is documented in Medical Record:  N/A    Multimodal Pain Management- (AQI59)  Patient undergoing Elective Surgery (i.e. Outpatient, or ASC, or Prescheduled Surgery prior to Hospital Admission): Yes  Use of Multimodal Pain Management, two or more drugs and/or interventions, NOT including systemic opioids: Yes   Exception: Documented allergy to multiple classes of analgesics:  N/A    PACU assessment of acute postoperative pain prior to Anesthesia Care End- Applies to Patients Age = 18- (ABG7)  Initial PACU pain score is which of the following: < 7/10  Patient unable to report pain score: N/A    Post-anesthetic transfer of care checklist/protocol to PACU/ICU- (426 and 427)  Upon conclusion of case, patient transferred to which of the following locations: PACU/Non-ICU  Use of transfer checklist/protocol: Yes  Exclusion: Service Performed in Patient Hospital Room (and thus did not require transfer): N/A    PACU Reintubation- (AQI31)  General anesthesia requiring endotracheal intubation (ETT) along with subsequent extubation in OR or PACU: Yes  Required reintubation in the PACU: No   Extubation was a planned trial documented in the medical record prior to removal of the original airway device:  N/A    Unplanned admission to ICU related to anesthesia service up through end of PACU care- (MD51)  Unplanned admission to ICU (not initially anticipated at anesthesia start time): No

## 2019-10-20 PROCEDURE — 700102 HCHG RX REV CODE 250 W/ 637 OVERRIDE(OP): Performed by: HOSPITALIST

## 2019-10-20 PROCEDURE — A9270 NON-COVERED ITEM OR SERVICE: HCPCS | Performed by: NEUROLOGICAL SURGERY

## 2019-10-20 PROCEDURE — 700102 HCHG RX REV CODE 250 W/ 637 OVERRIDE(OP): Performed by: NEUROLOGICAL SURGERY

## 2019-10-20 PROCEDURE — 97162 PT EVAL MOD COMPLEX 30 MIN: CPT

## 2019-10-20 PROCEDURE — 700111 HCHG RX REV CODE 636 W/ 250 OVERRIDE (IP): Performed by: HOSPITALIST

## 2019-10-20 PROCEDURE — A9270 NON-COVERED ITEM OR SERVICE: HCPCS | Performed by: INTERNAL MEDICINE

## 2019-10-20 PROCEDURE — 700102 HCHG RX REV CODE 250 W/ 637 OVERRIDE(OP): Performed by: INTERNAL MEDICINE

## 2019-10-20 PROCEDURE — A9270 NON-COVERED ITEM OR SERVICE: HCPCS | Performed by: HOSPITALIST

## 2019-10-20 PROCEDURE — 770001 HCHG ROOM/CARE - MED/SURG/GYN PRIV*

## 2019-10-20 PROCEDURE — 99232 SBSQ HOSP IP/OBS MODERATE 35: CPT | Performed by: INTERNAL MEDICINE

## 2019-10-20 PROCEDURE — 97165 OT EVAL LOW COMPLEX 30 MIN: CPT

## 2019-10-20 RX ORDER — VALSARTAN 80 MG/1
160 TABLET ORAL
Status: DISCONTINUED | OUTPATIENT
Start: 2019-10-20 | End: 2019-10-21

## 2019-10-20 RX ORDER — OXYCODONE HYDROCHLORIDE 5 MG/1
5 TABLET ORAL EVERY 6 HOURS PRN
Status: DISCONTINUED | OUTPATIENT
Start: 2019-10-20 | End: 2019-10-22 | Stop reason: HOSPADM

## 2019-10-20 RX ADMIN — SENNOSIDES, DOCUSATE SODIUM 2 TABLET: 50; 8.6 TABLET, FILM COATED ORAL at 05:09

## 2019-10-20 RX ADMIN — OXYCODONE HYDROCHLORIDE 2.5 MG: 5 TABLET ORAL at 05:10

## 2019-10-20 RX ADMIN — CYCLOBENZAPRINE 10 MG: 10 TABLET, FILM COATED ORAL at 05:09

## 2019-10-20 RX ADMIN — OMEPRAZOLE 40 MG: 20 CAPSULE, DELAYED RELEASE ORAL at 05:10

## 2019-10-20 RX ADMIN — SENNOSIDES, DOCUSATE SODIUM 2 TABLET: 50; 8.6 TABLET, FILM COATED ORAL at 17:12

## 2019-10-20 RX ADMIN — OXYCODONE HYDROCHLORIDE 5 MG: 5 TABLET ORAL at 20:37

## 2019-10-20 RX ADMIN — ONDANSETRON 4 MG: 4 TABLET, ORALLY DISINTEGRATING ORAL at 08:03

## 2019-10-20 RX ADMIN — CYCLOBENZAPRINE 10 MG: 10 TABLET, FILM COATED ORAL at 17:12

## 2019-10-20 RX ADMIN — MAGNESIUM HYDROXIDE 30 ML: 400 SUSPENSION ORAL at 15:27

## 2019-10-20 RX ADMIN — OXYCODONE HYDROCHLORIDE 2.5 MG: 5 TABLET ORAL at 08:03

## 2019-10-20 RX ADMIN — MAGNESIUM HYDROXIDE 30 ML: 400 SUSPENSION ORAL at 20:37

## 2019-10-20 RX ADMIN — CYCLOBENZAPRINE 10 MG: 10 TABLET, FILM COATED ORAL at 11:57

## 2019-10-20 RX ADMIN — LEVOTHYROXINE SODIUM 112 MCG: 112 TABLET ORAL at 05:09

## 2019-10-20 RX ADMIN — POLYETHYLENE GLYCOL 3350 1 PACKET: 17 POWDER, FOR SOLUTION ORAL at 17:18

## 2019-10-20 RX ADMIN — POLYETHYLENE GLYCOL 3350 1 PACKET: 17 POWDER, FOR SOLUTION ORAL at 20:38

## 2019-10-20 ASSESSMENT — COGNITIVE AND FUNCTIONAL STATUS - GENERAL
STANDING UP FROM CHAIR USING ARMS: A LITTLE
SUGGESTED CMS G CODE MODIFIER DAILY ACTIVITY: CJ
CLIMB 3 TO 5 STEPS WITH RAILING: A LOT
SUGGESTED CMS G CODE MODIFIER MOBILITY: CK
TOILETING: A LITTLE
MOVING FROM LYING ON BACK TO SITTING ON SIDE OF FLAT BED: A LITTLE
WALKING IN HOSPITAL ROOM: A LITTLE
DRESSING REGULAR LOWER BODY CLOTHING: A LOT
MOBILITY SCORE: 17
TURNING FROM BACK TO SIDE WHILE IN FLAT BAD: A LITTLE
MOVING TO AND FROM BED TO CHAIR: A LITTLE
DAILY ACTIVITIY SCORE: 20
HELP NEEDED FOR BATHING: A LITTLE

## 2019-10-20 ASSESSMENT — GAIT ASSESSMENTS
DISTANCE (FEET): 75
GAIT LEVEL OF ASSIST: MINIMAL ASSIST
DEVIATION: ATAXIC;DECREASED BASE OF SUPPORT
ASSISTIVE DEVICE: FRONT WHEEL WALKER

## 2019-10-20 ASSESSMENT — ENCOUNTER SYMPTOMS
BACK PAIN: 1
RESPIRATORY NEGATIVE: 1
CONSTITUTIONAL NEGATIVE: 1
GASTROINTESTINAL NEGATIVE: 1
EYES NEGATIVE: 1
CARDIOVASCULAR NEGATIVE: 1

## 2019-10-20 ASSESSMENT — ACTIVITIES OF DAILY LIVING (ADL): TOILETING: INDEPENDENT

## 2019-10-20 NOTE — THERAPY
"Physical Therapy Evaluation completed.   Bed Mobility:  Supine to Sit: (up in a chair )  Transfers: Sit to Stand: Minimal Assist  Gait: Level Of Assist: Minimal Assist(for verbal cuing only ) with Front-Wheel Walker       Plan of Care: Will benefit from Physical Therapy 5 times per week  Discharge Recommendations: Equipment: Front-Wheel Walker. Post-acute therapy Discharge to home with outpatient or home health for additional skilled therapy services.    See \"Rehab Therapy-Acute\" Patient Summary Report for complete documentation.     Pt is a 73 y/o female presenting to acute setting with acute lumbar epidural hematoma extending from T12-L5.  She has sensory deficits in her right leg, right foot drop and subjective right left weakness and underwent an L1-5 lami.  Pt very independent at baseline and with very supportive family.  She continues to report paresthesia and proprioception issues at the RLE and foot, but is able to correct gait deviations with frequent verbal cuing.  PT to continue working with Pt in acute setting, anticipate she will be able to DC home with family once medically able to do so and continue with home health therapy for additional balance and strengthening.    "

## 2019-10-20 NOTE — CARE PLAN
Problem: Communication  Goal: The ability to communicate needs accurately and effectively will improve  Outcome: PROGRESSING AS EXPECTED  Intervention: Use communication aids and/or /Language Line as appropriate  Flowsheets (Taken 10/19/2019 1730)  Patient's Primary Language: Palestinian  Note:   Pt used intepreter services this morning when family wasn't at bedside. Pt has been refusing  when family at bedside. Plan of care discussed with patient, neurosurgery and family. All questions answered.      Problem: Pain Management  Goal: Pain level will decrease to patient's comfort goal  Outcome: PROGRESSING AS EXPECTED  Intervention: Follow pain managment plan developed in collaboration with patient and Interdisciplinary Team  Note:   Pt refusing pain meds other than Flexeril at this time. Pt states pain is managed on oral Flexeril.

## 2019-10-20 NOTE — PROGRESS NOTES
RN MOBILITY NOTE     Surgery patient?: yes  Date of surgery: 10/18/19  Ambulated 50 ft on day of surgery? (N/A if today is not date of surgery): n/a  Number of times ambulated 50 feet or greater today: 3  Patient has been up to chair, edge of bed or HOB 90 degrees for all meals?: yes  Goal met? (goal is ambulating at least 50 feet 2 times on day shift, one time on night shift): yes  If patient did not meet mobility goal, why?: n/a

## 2019-10-20 NOTE — PROGRESS NOTES
0750 /75; HR 75. Pt sat up on the edge of the bed, became lightheaded, diaphoretic and nauseated. Repeat BP 78/43, HR 62. Pt placed back in bed in supine position, medicated with Zofran. Repeat BP supine 136/61, HR 66. Nausea improved. NS IVF restarted at 75ml/hr. Held Diovan. Informed neurosurgery but will discuss with hospitalist. Pt received extra dose of Hydralazine 5mg at 2100 last night.     1035 Rechecked /73 HR 83 supine. Sat pt on edge of bed. Pt denies nausea and dizziness. /71 HR 75 sitting. Pt eating and drinking. IVF stopped. Pt ambulated to chair with 1 assist and walker. Gait steady. Pt denies feeling dizzy or lightheaded. PT/OT to evaluate.

## 2019-10-20 NOTE — FACE TO FACE
Face to Face Supporting Documentation - Home Health    The encounter with this patient was in whole or in part the primary reason for home health admission.    Date of encounter:   Patient:                    MRN:                       YOB: 2017  Desiree Salas  [id number]  1945     Home health to see patient for:  Physical Therapy evaluation and treatment    Skilled need for:  Comment: Spinal epidural hemorrhage    Skilled nursing interventions to include:  Wound Care    Homebound status evidenced by:  Need the aid of supportive devices such as crutches, canes, wheelchairs or walkers. Leaving home requires a considerable and taxing effort. There is a normal inability to leave the home.    Community Physician to provide follow up care: Susi Grant M.D.     Optional Interventions? No      I certify the face to face encounter for this home health care referral meets the CMS requirements and the encounter/clinical assessment with the patient was, in whole, or in part, for the medical condition(s) listed above, which is the primary reason for home health care. Based on my clinical findings: the service(s) are medically necessary, support the need for home health care, and the homebound criteria are met.  I certify that this patient has had a face to face encounter by myself.  Aman Wood M.D. - NPI: [id number]

## 2019-10-20 NOTE — THERAPY
"Occupational Therapy Evaluation completed.   Functional Status: OT eval completed on 75 YO F s/p L1-5 lami for EDH. Pt min A for sit>Stand, max A for LB dressing, min A for toilet transfer, SPV for toileting, SPV for grooming while seated, and SPV for sit>supine. Pt very motivated and cooperative however required VC's at times to slow down. Pt with very supportive family. Will continue to follow for acute OT services while in-house.   Plan of Care: Will benefit from Occupational Therapy 4 times per week  Discharge Recommendations:  Equipment: Will Continue to Assess for Equipment Needs. Recommend home health transitional care for continued occupational therapy services.     See \"Rehab Therapy-Acute\" Patient Summary Report for complete documentation.    "

## 2019-10-20 NOTE — PROGRESS NOTES
Hospital Medicine Daily Progress Note    Date of Service  10/20/2019    Chief Complaint  74 y.o. female admitted 10/17/2019 with back pain    Hospital Course    *74 y.o. female with history of hypertension dyslipidemia who presented 10/17/2019 acute back pain, no trauma no fever no chills, numbness and some weakness in her lower extremities, MRI showed epidural hematoma, evaluated by neurosurgery, and lumpectomy with evacuation was done on 10/18, the patient felt better after surgery no weakness and her pain has improved.*          Interval Problem Update   -Evaluated and examined at bedside   -The patient had hypertension last night and morning developed orthostatic hypotension, will give her some fluid with improvement in her blood pressure which is stable right now around 120s.   -Resume her home medication losartan 120 and hold it for orthostatic or hypotension.   -Discontinue Dilaudid and decrease oxycodone to every 6 hours as needed and discontinue ketorolac.   -PT eval might discharge with home health after clearance from neurosurgery team.         Consultants/Specialty  Neurosurgery    Code Status  Full code    Disposition  Home health after clearance from neurosurgery team    Review of Systems  Review of Systems   Constitutional: Negative.    Eyes: Negative.    Respiratory: Negative.    Cardiovascular: Negative.    Gastrointestinal: Negative.    Genitourinary: Negative.    Musculoskeletal: Positive for back pain.        Improved         Physical Exam  Temp:  [36.9 °C (98.5 °F)-37.7 °C (99.9 °F)] 37.7 °C (99.9 °F)  Pulse:  [62-83] 75  Resp:  [12-16] 16  BP: ()/(43-77) 125/71  SpO2:  [91 %-94 %] 94 %    Physical Exam   Constitutional: She is oriented to person, place, and time. She appears well-developed. No distress.   Eyes: No scleral icterus.   Neck: No JVD present.   Cardiovascular: Normal rate.   No murmur heard.  Pulmonary/Chest: No respiratory distress. She has no wheezes. She has no rales.    Abdominal: Soft. She exhibits no distension. There is no tenderness. There is no rebound.   Neurological: She is alert and oriented to person, place, and time. She displays normal reflexes. No cranial nerve deficit. She exhibits normal muscle tone. Coordination normal.   Skin: Skin is warm. No rash noted. No erythema.       Fluids    Intake/Output Summary (Last 24 hours) at 10/20/2019 1542  Last data filed at 10/20/2019 1339  Gross per 24 hour   Intake 1080 ml   Output 310 ml   Net 770 ml       Laboratory  Recent Labs     10/18/19  0301 10/19/19  0451   WBC 8.2 9.9   RBC 4.75 4.24   HEMOGLOBIN 13.7 12.2   HEMATOCRIT 42.0 38.4   MCV 88.4 90.6   MCH 28.8 28.8   MCHC 32.6* 31.8*   RDW 44.1 46.4   PLATELETCT 201 187   MPV 11.3 11.6     Recent Labs     10/18/19  0301 10/19/19  0451   SODIUM 141 140   POTASSIUM 4.0 4.6   CHLORIDE 106 108   CO2 27 29   GLUCOSE 121* 121*   BUN 17 17   CREATININE 0.88 0.86   CALCIUM 9.4 8.9                   Imaging  DX-SPINE-ANY ONE VIEW   Final Result      Intraoperative images as described.      MR-LUMBAR SPINE-WITH   Final Result         1.  Large acute dorsal epidural hematoma causing marked compression of the thecal sac from the T12 level to the sacrum.      2.  No evidence of abnormal intradural or extradural enhancement in the lumbar region.      MR-LUMBAR SPINE-W/O   Final Result         1.  EXTENSIVE LARGE ACUTE DORSAL EPIDURAL HEMATOMA causing marked compression of the posterior aspect of the thecal sac from the T11 level to the sacrum.      These findings were discussed with Dr LADY WELSH at 11:10 AM 10/17/2019.      DX-LUMBAR SPINE-2 OR 3 VIEWS   Final Result         1.  No acute findings.      2.  Mild multilevel degenerative disc disease.      3.  Severe facet arthropathy in the lower lumbar spine.      4.  Minimal retrolisthesis at L2-3.      5.  Minimal anterolisthesis at L4-5 and L5-S1.      DX-PORTABLE FLUORO > 1 HOUR    (Results Pending)        Assessment/Plan  *  Epidural hematoma (HCC)  Assessment & Plan  Uncertain etiology: Denies trauma, TEG study was normal  MRI with contrast negative for mass but showed huge hematoma, no history of trauma   Dr. Cruz from neurosurgery>>Lumbar 1-5 laminectomy  done on 10/18  Improving after surgery.  No weakness on exam  PT OT eval : PT with home health  Discharge tomorrow if she is cleared by neurosurgery team      Hypokalemia  Assessment & Plan  Resolved  Monitoring     Hypothyroid- (present on admission)  Assessment & Plan  Last TSH was in 2013   continue levothyroxine home dose 112 at this time and reassess with new labs    IFG (impaired fasting glucose)- (present on admission)  Assessment & Plan  A1c 5.9  Healthy diet and losing weight consulted    Hypertension- (present on admission)  Assessment & Plan  Oral BP meds were held on admission to avoid hypotension  She developed hypertension and then later hypotension with orthostatic signs   start vlasartan 160 mg daily and hold it for hypotension  Monitoring closely PRN IV labetalol  Adjust her dose or add a new agent if needed.        VTE prophylaxis: SCDs at this time start with pharmacological DVT prophylaxis after clearance from neurosurgery.

## 2019-10-20 NOTE — PROGRESS NOTES
Called and spoke to Dr Vasquez about patients elevated blood pressure. Got PRN order for PRN hydralazine (see Emar)

## 2019-10-20 NOTE — CARE PLAN
Problem: Communication  Goal: The ability to communicate needs accurately and effectively will improve  Outcome: PROGRESSING AS EXPECTED  Intervention: Educate patient and significant other/support system about the plan of care, procedures, treatments, medications and allow for questions  Note:   Plan of care discussed with patient and family. Pt refusing  when family at bedside. Family translates.      Problem: Safety  Goal: Will remain free from injury  Outcome: PROGRESSING AS EXPECTED  Intervention: Provide assistance with mobility  Note:   Pt up with one assist and walker. Gait unsteady. PT/OT ordered. Bed in lowest and locked position. Bed and chair alarm on. Pt calls appropriately.

## 2019-10-20 NOTE — PROGRESS NOTES
Neurosurgery Progress Note    Subjective:  Pt awake, alert  Family at bedside to help with translation  Pain tolerable, low back pain 0-10/10, worse with movement  Feels n/t rt leg, improved since surgery  Improved motor function since surgery as well   Voiding  Hypotensive this morning    Exam:  LE str 5/5 except right TA 3/5  Inc c/d/i with hvac  Hemovac was 100cc/8hr am      BP  Min: 78/43  Max: 177/69  Pulse  Av  Min: 62  Max: 80  Resp  Avg: 15.1  Min: 12  Max: 16  Temp  Av.3 °C (99.2 °F)  Min: 36.9 °C (98.5 °F)  Max: 37.7 °C (99.9 °F)  SpO2  Av.2 %  Min: 91 %  Max: 95 %    No data recorded    Recent Labs     10/18/19  0301 10/19/19  0451   WBC 8.2 9.9   RBC 4.75 4.24   HEMOGLOBIN 13.7 12.2   HEMATOCRIT 42.0 38.4   MCV 88.4 90.6   MCH 28.8 28.8   MCHC 32.6* 31.8*   RDW 44.1 46.4   PLATELETCT 201 187   MPV 11.3 11.6     Recent Labs     10/18/19  0301 10/19/19  0451   SODIUM 141 140   POTASSIUM 4.0 4.6   CHLORIDE 106 108   CO2 27 29   GLUCOSE 121* 121*   BUN 17 17   CREATININE 0.88 0.86   CALCIUM 9.4 8.9         Recent Labs     10/17/19  1305   REACTMIN 6.5   CLOTKINET 2.2   CLOTANGL 59.9   MAXCLOTS 65.8   [id number] 0.0   PRCINADP 1.6   PRCINAA 40.8       Intake/Output       10/19/19 0700 - 10/20/19 0659 10/20/19 0700 - 10/21/19 0659       Total 1900-0659 Total       Intake    P.O.  600  -- 600  --  -- --    P.O. 600 -- 600 -- -- --    I.V.  8  -- 8  --  -- --    PCA End of Shift Total Volume (ml) 8 -- 8 -- -- --    Total Intake 608 -- 608 -- -- --       Output    Urine  250  -- 250  --  -- --    Number of Times Voided 2 x 1 x 3 x 1 x -- 1 x    Output (mL) ([REMOVED] Urethral Catheter Latex 16 Fr.) 250 -- 250 -- -- --    Drains  50  210 260  --  -- --    Output (mL) (Closed/Suction Drain 1 Posterior Back Hemovac) 50 210 260 -- -- --    Total Output 300 210 510 -- -- --       Net I/O     308 -210 98 -- -- --            Intake/Output Summary (Last 24 hours) at 10/20/2019  0842  Last data filed at 10/20/2019 0200  Gross per 24 hour   Intake 482 ml   Output 510 ml   Net -28 ml            • valsartan  160 mg Q DAY   • cyclobenzaprine  10 mg TID   • morphine injection  2 mg Q3HRS PRN   • omeprazole  40 mg DAILY   • ketorolac  15 mg Q6HRS PRN   • hydrALAZINE  5 mg Q6HRS PRN   • Pharmacy Consult Request  1 Each PHARMACY TO DOSE   • acetaminophen  650 mg Q6HRS PRN   • levothyroxine  112 mcg AM ES   • senna-docusate  2 Tab BID    And   • polyethylene glycol/lytes  1 Packet QDAY PRN    And   • magnesium hydroxide  30 mL QDAY PRN    And   • bisacodyl  10 mg QDAY PRN   • NS   Continuous   • Pharmacy Consult Request  1 Each PHARMACY TO DOSE    And   • oxyCODONE immediate-release  2.5 mg Q3HRS PRN    And   • oxyCODONE immediate-release  5 mg Q3HRS PRN    And   • HYDROmorphone  0.25 mg Q3HRS PRN   • ondansetron  4 mg Q4HRS PRN   • ondansetron  4 mg Q4HRS PRN   • labetalol  10 mg Q4HRS PRN         Assessment and Plan:  Hospital day # 3  POD # 2 L1-5 lami for EDH  Prophylactic anticoagulation: no             PT/OT  Continue hemovac to suction  Hypotension per IM  Disposition per IM

## 2019-10-21 ENCOUNTER — HOME HEALTH ADMISSION (OUTPATIENT)
Dept: HOME HEALTH SERVICES | Facility: HOME HEALTHCARE | Age: 74
End: 2019-10-21
Payer: COMMERCIAL

## 2019-10-21 ENCOUNTER — APPOINTMENT (OUTPATIENT)
Dept: CARDIOLOGY | Facility: MEDICAL CENTER | Age: 74
DRG: 028 | End: 2019-10-21
Attending: INTERNAL MEDICINE
Payer: MEDICAID

## 2019-10-21 LAB
ANION GAP SERPL CALC-SCNC: 6 MMOL/L (ref 0–11.9)
BASOPHILS # BLD AUTO: 0.3 % (ref 0–1.8)
BASOPHILS # BLD: 0.03 K/UL (ref 0–0.12)
BUN SERPL-MCNC: 12 MG/DL (ref 8–22)
CALCIUM SERPL-MCNC: 9.1 MG/DL (ref 8.5–10.5)
CHLORIDE SERPL-SCNC: 105 MMOL/L (ref 96–112)
CO2 SERPL-SCNC: 31 MMOL/L (ref 20–33)
CREAT SERPL-MCNC: 0.75 MG/DL (ref 0.5–1.4)
EOSINOPHIL # BLD AUTO: 0.15 K/UL (ref 0–0.51)
EOSINOPHIL NFR BLD: 1.7 % (ref 0–6.9)
ERYTHROCYTE [DISTWIDTH] IN BLOOD BY AUTOMATED COUNT: 44.9 FL (ref 35.9–50)
GLUCOSE SERPL-MCNC: 103 MG/DL (ref 65–99)
HCT VFR BLD AUTO: 38 % (ref 37–47)
HGB BLD-MCNC: 12.1 G/DL (ref 12–16)
IMM GRANULOCYTES # BLD AUTO: 0.03 K/UL (ref 0–0.11)
IMM GRANULOCYTES NFR BLD AUTO: 0.3 % (ref 0–0.9)
LV EJECT FRACT  99904: 75
LV EJECT FRACT MOD 2C 99903: 74.13
LV EJECT FRACT MOD 4C 99902: 76.81
LV EJECT FRACT MOD BP 99901: 74.83
LYMPHOCYTES # BLD AUTO: 2.01 K/UL (ref 1–4.8)
LYMPHOCYTES NFR BLD: 23.2 % (ref 22–41)
MAGNESIUM SERPL-MCNC: 2.4 MG/DL (ref 1.5–2.5)
MCH RBC QN AUTO: 28.5 PG (ref 27–33)
MCHC RBC AUTO-ENTMCNC: 31.8 G/DL (ref 33.6–35)
MCV RBC AUTO: 89.4 FL (ref 81.4–97.8)
MONOCYTES # BLD AUTO: 0.68 K/UL (ref 0–0.85)
MONOCYTES NFR BLD AUTO: 7.9 % (ref 0–13.4)
NEUTROPHILS # BLD AUTO: 5.75 K/UL (ref 2–7.15)
NEUTROPHILS NFR BLD: 66.6 % (ref 44–72)
NRBC # BLD AUTO: 0 K/UL
NRBC BLD-RTO: 0 /100 WBC
PLATELET # BLD AUTO: 182 K/UL (ref 164–446)
PMV BLD AUTO: 11.7 FL (ref 9–12.9)
POTASSIUM SERPL-SCNC: 4.2 MMOL/L (ref 3.6–5.5)
RBC # BLD AUTO: 4.25 M/UL (ref 4.2–5.4)
SODIUM SERPL-SCNC: 142 MMOL/L (ref 135–145)
WBC # BLD AUTO: 8.7 K/UL (ref 4.8–10.8)

## 2019-10-21 PROCEDURE — 700105 HCHG RX REV CODE 258: Performed by: HOSPITALIST

## 2019-10-21 PROCEDURE — A9270 NON-COVERED ITEM OR SERVICE: HCPCS | Performed by: INTERNAL MEDICINE

## 2019-10-21 PROCEDURE — 99232 SBSQ HOSP IP/OBS MODERATE 35: CPT | Performed by: INTERNAL MEDICINE

## 2019-10-21 PROCEDURE — 85025 COMPLETE CBC W/AUTO DIFF WBC: CPT

## 2019-10-21 PROCEDURE — 700102 HCHG RX REV CODE 250 W/ 637 OVERRIDE(OP): Performed by: HOSPITALIST

## 2019-10-21 PROCEDURE — 302118 SHAMPOO,NO RINSE: Performed by: INTERNAL MEDICINE

## 2019-10-21 PROCEDURE — A9270 NON-COVERED ITEM OR SERVICE: HCPCS | Performed by: NEUROLOGICAL SURGERY

## 2019-10-21 PROCEDURE — 93306 TTE W/DOPPLER COMPLETE: CPT | Mod: 26 | Performed by: INTERNAL MEDICINE

## 2019-10-21 PROCEDURE — 97116 GAIT TRAINING THERAPY: CPT

## 2019-10-21 PROCEDURE — 80048 BASIC METABOLIC PNL TOTAL CA: CPT

## 2019-10-21 PROCEDURE — 770001 HCHG ROOM/CARE - MED/SURG/GYN PRIV*

## 2019-10-21 PROCEDURE — 97530 THERAPEUTIC ACTIVITIES: CPT

## 2019-10-21 PROCEDURE — 36415 COLL VENOUS BLD VENIPUNCTURE: CPT

## 2019-10-21 PROCEDURE — 700102 HCHG RX REV CODE 250 W/ 637 OVERRIDE(OP): Performed by: INTERNAL MEDICINE

## 2019-10-21 PROCEDURE — 83735 ASSAY OF MAGNESIUM: CPT

## 2019-10-21 PROCEDURE — 93306 TTE W/DOPPLER COMPLETE: CPT

## 2019-10-21 PROCEDURE — A9270 NON-COVERED ITEM OR SERVICE: HCPCS | Performed by: HOSPITALIST

## 2019-10-21 PROCEDURE — 700102 HCHG RX REV CODE 250 W/ 637 OVERRIDE(OP): Performed by: NEUROLOGICAL SURGERY

## 2019-10-21 RX ORDER — VALSARTAN 80 MG/1
80 TABLET ORAL
Status: DISCONTINUED | OUTPATIENT
Start: 2019-10-21 | End: 2019-10-22 | Stop reason: HOSPADM

## 2019-10-21 RX ADMIN — CYCLOBENZAPRINE 10 MG: 10 TABLET, FILM COATED ORAL at 14:18

## 2019-10-21 RX ADMIN — BISACODYL 10 MG: 10 SUPPOSITORY RECTAL at 04:58

## 2019-10-21 RX ADMIN — OMEPRAZOLE 40 MG: 20 CAPSULE, DELAYED RELEASE ORAL at 04:57

## 2019-10-21 RX ADMIN — VALSARTAN 160 MG: 80 TABLET, FILM COATED ORAL at 04:57

## 2019-10-21 RX ADMIN — SODIUM CHLORIDE: 9 INJECTION, SOLUTION INTRAVENOUS at 08:07

## 2019-10-21 RX ADMIN — LEVOTHYROXINE SODIUM 112 MCG: 112 TABLET ORAL at 04:57

## 2019-10-21 RX ADMIN — CYCLOBENZAPRINE 10 MG: 10 TABLET, FILM COATED ORAL at 04:57

## 2019-10-21 RX ADMIN — VALSARTAN 80 MG: 80 TABLET, FILM COATED ORAL at 18:44

## 2019-10-21 RX ADMIN — SENNOSIDES, DOCUSATE SODIUM 2 TABLET: 50; 8.6 TABLET, FILM COATED ORAL at 04:57

## 2019-10-21 RX ADMIN — CYCLOBENZAPRINE 10 MG: 10 TABLET, FILM COATED ORAL at 18:44

## 2019-10-21 RX ADMIN — OXYCODONE HYDROCHLORIDE 5 MG: 5 TABLET ORAL at 18:43

## 2019-10-21 ASSESSMENT — ENCOUNTER SYMPTOMS
CONSTITUTIONAL NEGATIVE: 1
GASTROINTESTINAL NEGATIVE: 1
BACK PAIN: 1
EYES NEGATIVE: 1
CARDIOVASCULAR NEGATIVE: 1
RESPIRATORY NEGATIVE: 1

## 2019-10-21 ASSESSMENT — COGNITIVE AND FUNCTIONAL STATUS - GENERAL
TURNING FROM BACK TO SIDE WHILE IN FLAT BAD: A LITTLE
MOVING FROM LYING ON BACK TO SITTING ON SIDE OF FLAT BED: A LITTLE
STANDING UP FROM CHAIR USING ARMS: A LITTLE
SUGGESTED CMS G CODE MODIFIER MOBILITY: CK
MOBILITY SCORE: 18
WALKING IN HOSPITAL ROOM: A LITTLE
CLIMB 3 TO 5 STEPS WITH RAILING: A LITTLE
MOVING TO AND FROM BED TO CHAIR: A LITTLE

## 2019-10-21 ASSESSMENT — GAIT ASSESSMENTS
DISTANCE (FEET): 100
ASSISTIVE DEVICE: FRONT WHEEL WALKER
GAIT LEVEL OF ASSIST: MINIMAL ASSIST

## 2019-10-21 NOTE — THERAPY
"Physical Therapy Treatment completed.   Bed Mobility:  Supine to Sit: (NT up EOB w/ CNA)  Transfers: Sit to Stand: Supervised  Gait: Level Of Assist: Minimal Assist with Front-Wheel Walker       Plan of Care: Will benefit from Physical Therapy 5 times per week  Discharge Recommendations: Equipment: Front-Wheel Walker. Post-acute therapy Recommend home health transitional care for continued physical therapy services.     See \"Rehab Therapy-Acute\" Patient Summary Report for complete documentation.     Pt progressing well w/ therapy. Pt did need reminders for spinal precautions. Dtr present and was able to recall and assist pt w/ following them. Pt needing cues for log roll technique for bed mobility. HO provided to remind pt on how to perform. Pt demonstrating weak glutes during gait causing a trendelenburg and R hip ER to compensate and advance the R. Pt was able to correct the ER but still presenting w/ trendelenburg. Pt educated on an HEP to assist w/ this weakness. Pt is at a level in which she will benefit from HH upon DC w/ family support.  "

## 2019-10-21 NOTE — PROGRESS NOTES
Limited assist with adls, continent of Bladder.  Bowel management/result pending.  Breakthrough pain oxydone 5mg/Helpful.  Able to ambulate with SBA/FWW assistive device,  Surgical dressing to back, intact/hemovac in place.  No sob, no chest pain no respiratory distress.

## 2019-10-21 NOTE — DISCHARGE PLANNING
Case Management Note:  [facility] sent for [facility]LA at [facility]. Faxed to [facility] at [phone].

## 2019-10-21 NOTE — PROGRESS NOTES
Hospital Medicine Daily Progress Note    Date of Service  10/21/2019    Chief Complaint  74 y.o. female admitted 10/17/2019 with back pain    Hospital Course    *74 y.o. female with history of hypertension dyslipidemia who presented 10/17/2019 acute back pain, no trauma no fever no chills, numbness and some weakness in her lower extremities, MRI showed epidural hematoma, evaluated by neurosurgery, and lumpectomy with evacuation was done on 10/18, the patient felt better after surgery no weakness and her pain has improved.*          Interval Problem Update   -Evaluated and examined at bedside   -Still having orthostatic hypotension some improvement with the fluid   -We stopped her blood pressure medication   -Echo did not show any abnormalities   -Cleared by neurosurgery and home health was ordered   -Monitor her today for dizziness and orthostatic.         Consultants/Specialty  Neurosurgery    Code Status  Full code    Disposition  Home health tomorrow morning    Review of Systems  Review of Systems   Constitutional: Negative.    Eyes: Negative.    Respiratory: Negative.    Cardiovascular: Negative.    Gastrointestinal: Negative.    Genitourinary: Negative.    Musculoskeletal: Positive for back pain.        Improved         Physical Exam  Temp:  [36.4 °C (97.5 °F)-37.3 °C (99.2 °F)] 37.2 °C (99 °F)  Pulse:  [86-96] 96  Resp:  [16-18] 16  BP: ()/(48-87) 164/75  SpO2:  [93 %-94 %] 94 %    Physical Exam   Constitutional: She is oriented to person, place, and time. She appears well-developed. No distress.   Eyes: No scleral icterus.   Neck: No JVD present.   Cardiovascular: Normal rate.   No murmur heard.  Pulmonary/Chest: No respiratory distress. She has no wheezes. She has no rales.   Abdominal: Soft. She exhibits no distension. There is no tenderness. There is no rebound.   Neurological: She is alert and oriented to person, place, and time. She displays normal reflexes. No cranial nerve deficit. She exhibits  normal muscle tone. Coordination normal.   Skin: Skin is warm. No rash noted. No erythema.       Fluids    Intake/Output Summary (Last 24 hours) at 10/21/2019 1658  Last data filed at 10/21/2019 1200  Gross per 24 hour   Intake 600 ml   Output 190 ml   Net 410 ml       Laboratory  Recent Labs     10/19/19  0451 10/21/19  0318   WBC 9.9 8.7   RBC 4.24 4.25   HEMOGLOBIN 12.2 12.1   HEMATOCRIT 38.4 38.0   MCV 90.6 89.4   MCH 28.8 28.5   MCHC 31.8* 31.8*   RDW 46.4 44.9   PLATELETCT 187 182   MPV 11.6 11.7     Recent Labs     10/19/19  0451 10/21/19  0318   SODIUM 140 142   POTASSIUM 4.6 4.2   CHLORIDE 108 105   CO2 29 31   GLUCOSE 121* 103*   BUN 17 12   CREATININE 0.86 0.75   CALCIUM 8.9 9.1                   Imaging  EC-ECHOCARDIOGRAM COMPLETE W/O CONT   Final Result      DX-PORTABLE FLUORO > 1 HOUR   Final Result      Portable fluoroscopy utilized for 6 seconds.         INTERPRETING LOCATION: [address] [place] [address], [facility]      DX-SPINE-ANY ONE VIEW   Final Result      Intraoperative images as described.      MR-LUMBAR SPINE-WITH   Final Result         1.  Large acute dorsal epidural hematoma causing marked compression of the thecal sac from the T12 level to the sacrum.      2.  No evidence of abnormal intradural or extradural enhancement in the lumbar region.      MR-LUMBAR SPINE-W/O   Final Result         1.  EXTENSIVE LARGE ACUTE DORSAL EPIDURAL HEMATOMA causing marked compression of the posterior aspect of the thecal sac from the T11 level to the sacrum.      These findings were discussed with Dr LADY WELSH at 11:10 AM 10/17/2019.      DX-LUMBAR SPINE-2 OR 3 VIEWS   Final Result         1.  No acute findings.      2.  Mild multilevel degenerative disc disease.      3.  Severe facet arthropathy in the lower lumbar spine.      4.  Minimal retrolisthesis at L2-3.      5.  Minimal anterolisthesis at L4-5 and L5-S1.           Assessment/Plan  * Epidural hematoma (HCC)  Assessment & Plan  Uncertain etiology: Denies  trauma, TEG study was normal  MRI with contrast negative for mass but showed huge hematoma, no history of trauma   Dr. Cruz from neurosurgery>>Lumbar 1-5 laminectomy  done on 10/18  Improving after surgery.  No weakness on exam  PT OT eval : PT with home health  Monitor her today for dizziness and orthostatic hypotension  Discharge tomorrow if she stays stable    Hypertension- (present on admission)  Assessment & Plan  Oral BP meds were held on admission to avoid hypotension  She developed hypertension and then later hypotension with orthostatic signs   Echo did not show any abnormalities  We will resume valsartan with low-dose 80 mg daily(home dose is 320 mg)  Monitoring closely PRN IV labetalol  Adjust her dose or add a new agent if needed.     Hypokalemia  Assessment & Plan  Resolved  Monitoring     Hypothyroid- (present on admission)  Assessment & Plan  Last TSH was in 2013   continue levothyroxine home dose 112 at this time and reassess with new labs    IFG (impaired fasting glucose)- (present on admission)  Assessment & Plan  A1c 5.9  Healthy diet and losing weight consulted       VTE prophylaxis: SCDs

## 2019-10-21 NOTE — DISCHARGE PLANNING
Thank you for sending this referral to [facility]. Is this patient going to be a CHELA? Also the patient has not seen their PCP in over 5 years please make an appointment to re-establish. The referral will be placed on hold until the above information has been given.

## 2019-10-21 NOTE — PROGRESS NOTES
Neurosurgery Progress Note    Subjective:  Pain controlled  Ambulated with therapies      Exam:  A&O, family at bedside assisting with translation  LE str 5/5 except right TA 3/5  Inc c/d/i with hvac  Hemovac was 70cc/8hr am. Mixed bloody serous      BP  Min: 84/48  Max: 156/87  Pulse  Av.4  Min: 86  Max: 90  Resp  Av  Min: 16  Max: 18  Temp  Av.7 °C (98.1 °F)  Min: 36.4 °C (97.5 °F)  Max: 37.3 °C (99.2 °F)  SpO2  Av.8 %  Min: 93 %  Max: 94 %    No data recorded    Recent Labs     10/19/19  0451 10/21/19  0318   WBC 9.9 8.7   RBC 4.24 4.25   HEMOGLOBIN 12.2 12.1   HEMATOCRIT 38.4 38.0   MCV 90.6 89.4   MCH 28.8 28.5   MCHC 31.8* 31.8*   RDW 46.4 44.9   PLATELETCT 187 182   MPV 11.6 11.7     Recent Labs     10/19/19  0451 10/21/19  0318   SODIUM 140 142   POTASSIUM 4.6 4.2   CHLORIDE 108 105   CO2 29 31   GLUCOSE 121* 103*   BUN 17 12   CREATININE 0.86 0.75   CALCIUM 8.9 9.1               Intake/Output       10/20/19 0700 - 10/21/19 0659 10/21/19 0700 - 10/22/19 0659      1900-0659 Total 1900-0659 Total       Intake    P.O.    -- 1080  --  -- --    P.O.  -- 1080 -- -- --    Total Intake  --  -- -- --       Output    Urine  --  -- --  --  -- --    Number of Times Voided 2 x 1 x 3 x 1 x -- 1 x    Drains  100  120 220  70  -- 70    Output (mL) (Closed/Suction Drain 1 Posterior Back Hemovac) 100 120 220 70 -- 70    Total Output 100 120 220 70 -- 70       Net I/O     980 -120 860 -70 -- -70            Intake/Output Summary (Last 24 hours) at 10/21/2019 1426  Last data filed at 10/21/2019 1200  Gross per 24 hour   Intake 240 ml   Output 190 ml   Net 50 ml            • Pharmacy Consult Request  1 Each PHARMACY TO DOSE    And   • oxyCODONE immediate-release  5 mg Q6HRS PRN   • cyclobenzaprine  10 mg TID   • omeprazole  40 mg DAILY   • hydrALAZINE  5 mg Q6HRS PRN   • levothyroxine  112 mcg AM ES   • senna-docusate  2 Tab BID    And   • polyethylene glycol/lytes  1 Packet  QDAY PRN    And   • magnesium hydroxide  30 mL QDAY PRN    And   • bisacodyl  10 mg QDAY PRN   • ondansetron  4 mg Q4HRS PRN   • ondansetron  4 mg Q4HRS PRN   • labetalol  10 mg Q4HRS PRN         Assessment and Plan:  Hospital day # 4  POD # 3 L1-5 lami for EDH  Prophylactic anticoagulation: no             Continue hemovac to suction, remove in AM  Hypotension per IM, improved today  Disposition per IM. PT/OT- recommending home health. OK to DC from NSGY POV when medically cleared & drain has been removed

## 2019-10-21 NOTE — CARE PLAN
Bowel management/constipation, encourage fluid intake and ambulation      Breakthrough pain PRN, monitor for s/sx of pain or discomfort, early ambulation/FWW  assist,  Log rolling

## 2019-10-21 NOTE — DISCHARGE PLANNING
Received Choice form at 0900  Agency/Facility Name: [facility]  Referral sent per Choice form @ 0905

## 2019-10-22 VITALS
HEART RATE: 87 BPM | HEIGHT: 59 IN | WEIGHT: 163.58 LBS | DIASTOLIC BLOOD PRESSURE: 70 MMHG | OXYGEN SATURATION: 90 % | TEMPERATURE: 99.2 F | RESPIRATION RATE: 16 BRPM | BODY MASS INDEX: 32.98 KG/M2 | SYSTOLIC BLOOD PRESSURE: 129 MMHG

## 2019-10-22 PROCEDURE — A9270 NON-COVERED ITEM OR SERVICE: HCPCS | Performed by: INTERNAL MEDICINE

## 2019-10-22 PROCEDURE — 700102 HCHG RX REV CODE 250 W/ 637 OVERRIDE(OP): Performed by: HOSPITALIST

## 2019-10-22 PROCEDURE — A9270 NON-COVERED ITEM OR SERVICE: HCPCS | Performed by: NEUROLOGICAL SURGERY

## 2019-10-22 PROCEDURE — 700102 HCHG RX REV CODE 250 W/ 637 OVERRIDE(OP): Performed by: NEUROLOGICAL SURGERY

## 2019-10-22 PROCEDURE — 97535 SELF CARE MNGMENT TRAINING: CPT

## 2019-10-22 PROCEDURE — 700102 HCHG RX REV CODE 250 W/ 637 OVERRIDE(OP): Performed by: INTERNAL MEDICINE

## 2019-10-22 PROCEDURE — A9270 NON-COVERED ITEM OR SERVICE: HCPCS | Performed by: HOSPITALIST

## 2019-10-22 PROCEDURE — 99239 HOSP IP/OBS DSCHRG MGMT >30: CPT | Performed by: HOSPITALIST

## 2019-10-22 RX ORDER — VALSARTAN 80 MG/1
80 TABLET ORAL DAILY
Qty: 30 TAB | Refills: 3 | Status: SHIPPED | OUTPATIENT
Start: 2019-10-23 | End: 2020-04-30

## 2019-10-22 RX ORDER — CYCLOBENZAPRINE HCL 10 MG
10 TABLET ORAL 3 TIMES DAILY PRN
Qty: 30 TAB | Refills: 1 | Status: SHIPPED | OUTPATIENT
Start: 2019-10-22 | End: 2020-04-30

## 2019-10-22 RX ORDER — AMOXICILLIN 250 MG
2 CAPSULE ORAL 2 TIMES DAILY PRN
Qty: 30 TAB | Refills: 0 | Status: SHIPPED | OUTPATIENT
Start: 2019-10-22 | End: 2020-04-30

## 2019-10-22 RX ORDER — OXYCODONE HYDROCHLORIDE 5 MG/1
5 TABLET ORAL EVERY 6 HOURS PRN
Qty: 20 TAB | Refills: 0 | Status: SHIPPED | OUTPATIENT
Start: 2019-10-22 | End: 2019-10-29

## 2019-10-22 RX ADMIN — SENNOSIDES, DOCUSATE SODIUM 2 TABLET: 50; 8.6 TABLET, FILM COATED ORAL at 04:28

## 2019-10-22 RX ADMIN — VALSARTAN 80 MG: 80 TABLET, FILM COATED ORAL at 04:28

## 2019-10-22 RX ADMIN — LEVOTHYROXINE SODIUM 112 MCG: 112 TABLET ORAL at 04:28

## 2019-10-22 RX ADMIN — CYCLOBENZAPRINE 10 MG: 10 TABLET, FILM COATED ORAL at 11:20

## 2019-10-22 RX ADMIN — OMEPRAZOLE 40 MG: 20 CAPSULE, DELAYED RELEASE ORAL at 04:28

## 2019-10-22 RX ADMIN — OXYCODONE HYDROCHLORIDE 5 MG: 5 TABLET ORAL at 04:27

## 2019-10-22 RX ADMIN — CYCLOBENZAPRINE 10 MG: 10 TABLET, FILM COATED ORAL at 04:28

## 2019-10-22 ASSESSMENT — COGNITIVE AND FUNCTIONAL STATUS - GENERAL
DRESSING REGULAR LOWER BODY CLOTHING: A LITTLE
HELP NEEDED FOR BATHING: A LITTLE
TOILETING: A LITTLE
SUGGESTED CMS G CODE MODIFIER DAILY ACTIVITY: CJ
DAILY ACTIVITIY SCORE: 21

## 2019-10-22 NOTE — PROGRESS NOTES
Neurosurgery Progress Note    Subjective:  No acute events. Improved RLE strength post op with continued decreased sensation      Exam:  A&O, family at bedside assisting with translation  3mm PERRL  Strength: Right IP 4/5, PF 1/5, DF 3/5, EHL 0/5. Left IP, DF, PF, EHL 5/5.   Sensation: decreased in right leg thigh through foot. Pt is able to identify which toes are being touched. Left leg sensation intact throughout  Dressing: c/d/i. Staples intact underneath with no swelling, drainage, or redness.   Eating, Drinking/ Denies n/v  Voiding. +BM 10/21/2019  Pain controlled  Ambulatory with walker        BP  Min: 129/70  Max: 164/75  Pulse  Av.2  Min: 87  Max: 101  Resp  Av  Min: 16  Max: 16  Temp  Av.8 °C (98.2 °F)  Min: 36.1 °C (97 °F)  Max: 37.5 °C (99.5 °F)  SpO2  Av %  Min: 90 %  Max: 94 %    No data recorded    Recent Labs     10/21/19  0318   WBC 8.7   RBC 4.25   HEMOGLOBIN 12.1   HEMATOCRIT 38.0   MCV 89.4   MCH 28.5   MCHC 31.8*   RDW 44.9   PLATELETCT 182   MPV 11.7     Recent Labs     10/21/19  0318   SODIUM 142   POTASSIUM 4.2   CHLORIDE 105   CO2 31   GLUCOSE 103*   BUN 12   CREATININE 0.75   CALCIUM 9.1               Intake/Output       10/21/19 0700 - 10/22/19 0659 10/22/19 0700 - 10/23/19 0659       Total  0507-7122 Total       Intake    P.O.  360  -- 360  --  -- --    P.O. 360 -- 360 -- -- --    Total Intake 360 -- 360 -- -- --       Output    Urine  --  -- --  --  -- --    Number of Times Voided 2 x -- 2 x -- -- --    Drains  70  -- 70  --  -- --    Output (mL) ([REMOVED] Closed/Suction Drain 1 Posterior Back Hemovac) 70 -- 70 -- -- --    Stool  --  -- --  --  -- --    Number of Times Stooled -- 1 x 1 x -- -- --    Total Output 70 -- 70 -- -- --       Net I/O     290 -- 290 -- -- --            Intake/Output Summary (Last 24 hours) at 10/22/2019 0857  Last data filed at 10/21/2019 1200  Gross per 24 hour   Intake 360 ml   Output 70 ml   Net 290 ml             • valsartan  80 mg Q DAY   • Pharmacy Consult Request  1 Each PHARMACY TO DOSE    And   • oxyCODONE immediate-release  5 mg Q6HRS PRN   • cyclobenzaprine  10 mg TID   • omeprazole  40 mg DAILY   • hydrALAZINE  5 mg Q6HRS PRN   • levothyroxine  112 mcg AM ES   • senna-docusate  2 Tab BID    And   • polyethylene glycol/lytes  1 Packet QDAY PRN    And   • magnesium hydroxide  30 mL QDAY PRN    And   • bisacodyl  10 mg QDAY PRN   • ondansetron  4 mg Q4HRS PRN   • ondansetron  4 mg Q4HRS PRN   • labetalol  10 mg Q4HRS PRN         Assessment and Plan:  Hospital day # 5  POD # 4 L1-5 lami for EDH  Prophylactic anticoagulation: no             Plan:  Stable neuro exam. Improved from prior to surgery  Pain well controlled  Removed lumbar dressing today  Patient can shower, soap/water to run over incision and pat dry. No dressing required.   Patient is clear for discharge from [facility]. Discussed post op care with family at bedside.   No ASA or anticoagulants    Keep incision clean and dry. No dressing required over incision.   OK to shower and pat dry.   Do not soak incision in bath, hot tub, etc.   Take over the counter stool softener daily with pain medication.   Do not lift anything over 10 pounds. No repetitive bending, lifting, twisting, movements.   No Aspirin or anticoagulants until cleared by Neurosurgery.   Staples to be removed 2 weeks from surgery.   Follow up with [facility] Neurosurgery, Call for an appointment [phone]

## 2019-10-22 NOTE — DISCHARGE PLANNING
Anticipated Discharge Disposition: Home with home health    Action: Spoke with patient's daughter, Rosey, She anticipates going home after DC. CM made appt. With PCP, Brynn Eller NP. Nov. 4, 2019 at 9:15.  Patient has no Rx coverage and no insurance, pt has Medicaid pending. CM sent an CHELA to [facility]. Pt lives with daughter and adult grandchildren. Prior to admit patient was independent of ADL/IADL's. She does not drive. CM notified [facility] of upcoming appt. She will notify [facility].     Barriers to Discharge: Medical Clearance    Plan: Alena with home health.      Care Transition Team Assessment    Information Source  Orientation : Oriented x 4  Information Given By: Relative  Informant's Name: Rosey Bowles  Who is responsible for making decisions for patient? : Patient    Readmission Evaluation  Is this a readmission?: No    Elopement Risk  Legal Hold: No  Ambulatory or Self Mobile in Wheelchair: Yes  Disoriented: No  Psychiatric Symptoms: None  History of Wandering: No  Elopement this Admit: No  Vocalizing Wanting to Leave: No  Displays Behaviors, Body Language Wanting to Leave: No-Not at Risk for Elopement  Elopement Risk: Not at Risk for Elopement    Interdisciplinary Discharge Planning  Lives with - Patient's Self Care Capacity: Adult Children  Patient or legal guardian wants to designate a caregiver (see row info): No  Support Systems: Children  Housing / Facility: [address]  Do You Take your Prescribed Medications Regularly: Yes  Able to Return to Previous ADL's: Yes  Mobility Issues: No  Prior Services: Skilled Home Health Services  Patient Expects to be Discharged to:: Home  Assistance Needed: Yes    Discharge Preparedness  What is your plan after discharge?: Home health care  What are your discharge supports?: Child  Prior Functional Level: Ambulatory, Independent with Activities of Daily Living, Independent with Medication Management  Difficulity with ADLs: None  Difficulity with  IADLs: Driving    Functional Assesment  Prior Functional Level: Ambulatory, Independent with Activities of Daily Living, Independent with Medication Management    Finances  Financial Barriers to Discharge: Yes  Average Monthly Income: 0 $  Prescription Coverage: No    Vision / Hearing Impairment  Vision Impairment : No  Hearing Impairment : No         Advance Directive  Advance Directive?: None  Advance Directive offered?: AD Booklet given    Domestic Abuse  Have you ever been the victim of abuse or violence?: No  Physical Abuse or Sexual Abuse: No  Verbal Abuse or Emotional Abuse: No  Possible Abuse Reported to:: Not Applicable    Psychological Assessment  History of Substance Abuse: None  History of Psychiatric Problems: No  Non-compliant with Treatment: No    Discharge Risks or Barriers  Discharge risks or barriers?: Uninsured / underinsured, No  Patient risk factors: Uninsured or underinsured    Anticipated Discharge Information  Anticipated discharge disposition: [facility], Home  Discharge Address: [address] [place] [address]  Discharge Contact Phone Number: [phone]

## 2019-10-22 NOTE — CARE PLAN
Hand hygiene, Dressing intact to surgical site, no s/sx of infection      Encourage to ambulate tolerating SBA/FWW assistive device, steady gait, call for help when needed. Educate to inform staff when need assistance.

## 2019-10-22 NOTE — DISCHARGE INSTRUCTIONS
Discharge Instructions    Discharged to home by car with relative. Discharged via wheelchair, hospital escort: Yes.  Special equipment needed: Walker    Be sure to schedule a follow-up appointment with your primary care doctor or any specialists as instructed.     Discharge Plan:   Influenza Vaccine Indication: Patient Refuses    I understand that a diet low in cholesterol, fat, and sodium is recommended for good health. Unless I have been given specific instructions below for another diet, I accept this instruction as my diet prescription.   Other diet: Regular    Special Instructions:   • You may shower the day after your drain was removed. Leave incision open to air and pat dry.   • Don't apply creams to your incision  • No baths or hot tubs for 6 weeks post surgery or until MD   • No bending, lifting greater than 10lbs or twisting  • No NSAIDs (non-steroidal anti-inflammatories) for 14 days after surgery (Advil, Celebrex, Naproxen, Ibuprofen).   • Don't drive for 2 weeks or while taking narcotics  • Take stool softeners/laxatives while taking narcotics  • Schedule follow up appointment with [facility] 2 weeks after surgery  • Inspect the incision and call the office if there is redness, swelling, increased pain, drainage from the incision.     · Is patient discharged on Warfarin / Coumadin?   No     Descompresión medular quirúrgica, cuidados posteriores  (Surgical Spinal Decompression, Care After)  Siga estas instrucciones sg las próximas semanas. Estas indicaciones le proporcionan información acerca de cómo deberá cuidarse después del procedimiento. El médico también podrá darle instrucciones más específicas. El tratamiento kay sido planificado según las prácticas médicas actuales, josey en algunos casos pueden ocurrir problemas. Comuníquese con el médico si tiene algún problema o dudas después del procedimiento.  QUÉ ESPERAR DESPUÉS DEL PROCEDIMIENTO  Es normal sentir dolor sg los primeros  días después del procedimiento. Algunas personas siguen teniendo dolor leve después de haberse recuperado por completo.  INSTRUCCIONES PARA EL CUIDADO EN EL HOGAR  Medicamentos   · [place] los medicamentos solamente joan se lo haya indicado el médico.  · No tome analgésicos de venta daysi a menos que el médico le indique que puede hacerlo. Estos medicamentos interfieren en el desarrollo y el crecimiento de nuevas células óseas.  · Si le recetaron un opiáceo, tómelo exactamente joan se lo haya indicado el médico.  ¨ No jeni alcohol mientras loli el medicamento.  ¨ No conduzca vehículos mientras loli el medicamento.  Cuidado de la lesión  · Cuide el soporte para la espalda joan se lo haya indicado el médico.  · Si se lo indican, aplique hielo sobre la yakov lesionada:  ¨ Ponga el hielo en geena bolsa plástica.  ¨ Coloque geena toalla entre la piel y la bolsa de hielo.  ¨ Coloque el hielo sg 20 minutos, 2 a 3 veces por día.  Actividades   · [place] los ejercicios de fisioterapia joan se lo haya indicado el médico.  · [place] ejercicios regularmente. Comience con caminatas cortas. Aumente lentamente el nivel de actividad con el tiempo. Los ejercicios suaves ayudan a aliviar el dolor.  · Siéntese, párese, camine, dese vuelta en la cama y cambie de posición joan se lo haya indicado el médico. [place] ayudará a que la columna se mantenga julio cesar alineada.  · No se agache ni gire el cuerpo.  · No cornelius tareas domésticas intensas, joan pasar la aspiradora.  · No levante ningún objeto que pese más de 10 libras (4,5 kg).  Otras indicaciones   · Concurra a todas las visitas de control joan se lo haya indicado el médico. [place] es importante.  · No consuma ningún producto que contenga tabaco, lo que incluye cigarrillos, tabaco de mascar o cigarrillos electrónicos. Si necesita ayuda para dejar de fumar, consulte al médico. La nicotina afecta la forma en que los huesos se consolidan.  SOLICITE ATENCIÓN MÉDICA SI:  · El dolor empeora.  · Tiene  fiebre.  · Tiene enrojecimiento, hinchazón o dolor en el lugar de la incisión.  · Observa líquido, connie o pus que emanan de la incisión.  · Siente adormecimiento, hormigueo o debilidad en alguna parte del cuerpo.  SOLICITE ATENCIÓN MÉDICA DE INMEDIATO SI:  · La incisión está hinchada y sensible, y la yakov circundante tiene el aspecto de un bulto. El bulto puede estar enrojecido o azulado.  · No puede  alguna parte del cuerpo (parálisis).  · No puede controlar los intestinos o la vejiga.  Esta información no tiene joan fin reemplazar el consejo del médico. Asegúrese de hacerle al médico cualquier pregunta que tenga.  Document Released: 09/07/2016 Document Revised: 09/07/2016 Document Reviewed: 12/21/2015  [employer] Interactive Patient Education © 2017 [employer]      Depression / Suicide Risk    As you are discharged from this [facility] facility, it is important to learn how to keep safe from harming yourself.    Recognize the warning signs:  · Abrupt changes in personality, positive or negative- including increase in energy   · Giving away possessions  · Change in eating patterns- significant weight changes-  positive or negative  · Change in sleeping patterns- unable to sleep or sleeping all the time   · Unwillingness or inability to communicate  · Depression  · Unusual sadness, discouragement and loneliness  · Talk of wanting to die  · Neglect of personal appearance   · Rebelliousness- reckless behavior  · Withdrawal from people/activities they love  · Confusion- inability to concentrate     If you or a loved one observes any of these behaviors or has concerns about self-harm, here's what you can do:  · Talk about it- your feelings and reasons for harming yourself  · Remove any means that you might use to hurt yourself (examples: pills, rope, extension cords, firearm)  · Get professional help from the community (Mental Health, Substance Abuse, psychological counseling)  · Do not be alone:Call your  Safe Contact- someone whom you trust who will be there for you.  · Call your local CRISIS HOTLINE 897-4563 or 133-224-6424  · Call your local Children's Mobile Crisis Response Team [place] (688) 605-0233 or www.HashParade  · Call the toll free National Suicide Prevention Hotlines   · National Suicide Prevention Lifeline 312-017-MLCI (5236)  · [facility] 800-SUICIDE (824-3501)    Cyclobenzaprine tablets  What is this medicine?  CYCLOBENZAPRINE (syvahe klmarc LEONA za preen) is a muscle relaxer. It is used to treat muscle pain, spasms, and stiffness.  This medicine may be used for other purposes; ask your health care provider or pharmacist if you have questions.  COMMON BRAND NAME(S): Fexmid, Flexeril  What should I tell my health care provider before I take this medicine?  They need to know if you have any of these conditions:  -heart disease, irregular heartbeat, or previous heart attack  -liver disease  -thyroid problem  -an unusual or allergic reaction to cyclobenzaprine, tricyclic antidepressants, lactose, other medicines, foods, dyes, or preservatives  -pregnant or trying to get pregnant  -breast-feeding  How should I use this medicine?  Take this medicine by mouth with a glass of water. Follow the directions on the prescription label. If this medicine upsets your stomach, take it with food or milk. Take your medicine at regular intervals. Do not take it more often than directed.  Talk to your pediatrician regarding the use of this medicine in children. Special care may be needed.  Overdosage: If you think you have taken too much of this medicine contact a poison control center or emergency room at once.  NOTE: This medicine is only for you. Do not share this medicine with others.  What if I miss a dose?  If you miss a dose, take it as soon as you can. If it is almost time for your next dose, take only that dose. Do not take double or extra doses.  What may interact with this medicine?  Do  not take this medicine with any of the following medications:  -certain medicines for fungal infections like fluconazole, itraconazole, ketoconazole, posaconazole, voriconazole  -cisapride  -dofetilide  -dronedarone  -halofantrine  -levomethadyl  -MAOIs like Carbex, Eldepryl, Marplan, Nardil, and Parnate  -narcotic medicines for cough  -pimozide  -thioridazine  -ziprasidone  This medicine may also interact with the following medications:  -alcohol  -antihistamines for allergy, cough and cold  -certain medicines for anxiety or sleep  -certain medicines for cancer  -certain medicines for depression like amitriptyline, fluoxetine, sertraline  -certain medicines for infection like alfuzosin, chloroquine, clarithromycin, levofloxacin, mefloquine, pentamidine, troleandomycin  -certain medicines for irregular heart beat  -certain medicines for seizures like phenobarbital, primidone  -contrast dyes  -general anesthetics like halothane, isoflurane, methoxyflurane, propofol  -local anesthetics like lidocaine, pramoxine, tetracaine  -medicines that relax muscles for surgery  -narcotic medicines for pain  -other medicines that prolong the QT interval (cause an abnormal heart rhythm)  -phenothiazines like chlorpromazine, mesoridazine, prochlorperazine  This list may not describe all possible interactions. Give your health care provider a list of all the medicines, herbs, non-prescription drugs, or dietary supplements you use. Also tell them if you smoke, drink alcohol, or use illegal drugs. Some items may interact with your medicine.  What should I watch for while using this medicine?  Tell your doctor or health care professional if your symptoms do not start to get better or if they get worse.  You may get drowsy or dizzy. Do not drive, use machinery, or do anything that needs mental alertness until you know how this medicine affects you. Do not stand or sit up quickly, especially if you are an older patient. This reduces the  risk of dizzy or fainting spells. Alcohol may interfere with the effect of this medicine. Avoid alcoholic drinks.  If you are taking another medicine that also causes drowsiness, you may have more side effects. Give your health care provider a list of all medicines you use. Your doctor will tell you how much medicine to take. Do not take more medicine than directed. Call emergency for help if you have problems breathing or unusual sleepiness.  Your mouth may get dry. Chewing sugarless gum or sucking hard candy, and drinking plenty of water may help. Contact your doctor if the problem does not go away or is severe.  What side effects may I notice from receiving this medicine?  Side effects that you should report to your doctor or health care professional as soon as possible:  -allergic reactions like skin rash, itching or hives, swelling of the face, lips, or tongue  -breathing problems  -chest pain  -fast, irregular heartbeat  -hallucinations  -seizures  -unusually weak or tired  Side effects that usually do not require medical attention (report to your doctor or health care professional if they continue or are bothersome):  -headache  -nausea, vomiting  This list may not describe all possible side effects. Call your doctor for medical advice about side effects. You may report side effects to FDA at 9-093-FDA-2696.  Where should I keep my medicine?  Keep out of the reach of children.  Store at room temperature between 15 and 30 degrees C (59 and 86 degrees F). Keep container tightly closed. Throw away any unused medicine after the expiration date.  NOTE: This sheet is a summary. It may not cover all possible information. If you have questions about this medicine, talk to your doctor, pharmacist, or health care provider.  © 2018 Elsevier/Gold Standard (2016-09-27 12:05:46)      Cyclobenzaprine tablets  ¿Qué es isac medicamento?  La CICLOBENZAPRINA es un relajante muscular. Se utiliza para tratar el dolor y la rigidez  de los músculos y espasmos musculares.  Temitope medicamento puede ser utilizado para otros usos; si tiene alguna pregunta consulte con burton proveedor de atención médica o con burton farmacéutico.  MARCAS COMUNES: Fexmid, Flexeril  ¿Qué le fidencio informar a mi profesional de la keshav antes de hari temitope medicamento?  Necesita saber si usted presenta alguno de los siguientes problemas o situaciones:  -enfermedad cardiaca, pulso cardiaco irregular o ataque cardiaco previo  -enfermedad hepática  -problemas tiroideos  -geena reacción alérgica o inusual a la ciclobenzaprina, antidepresivos tricíclicos, lactosa, a otros medicamentos, alimentos, colorantes o conservadores  -si está embarazada o buscando quedar embarazada  -si está amamantando a un bebé  ¿Cómo fidencio utilizar temitope medicamento?  [place] temitope medicamento por vía oral con un vaso de agua. Siga las instrucciones de la etiqueta del medicamento. Si temitope medicamento le produce malestar estomacal, tómelo con alimentos o con leche. No tome burton medicamento con geena frecuencia mayor a la indicada.  Hable con burton pediatra para informarse acerca del uso de temitope medicamento en niños. Puede requerir atención especial.  Sobredosis: Póngase en contacto inmediatamente con un centro toxicológico o geena jose de urgencia si usted juan carlos que haya tomado demasiado medicamento.  ATENCIÓN: Temitope medicamento es solo para usted. No comparta temitope medicamento con nadie.  ¿Qué sucede si me olvido de geena dosis?  Si olvida geena dosis, tómela lo antes posible. Si es darling la hora de burton dosis siguiente, tome sólo lucy dosis. No tome dosis adicionales o dobles.  ¿Qué puede interactuar con temitope medicamento?  No tome esta medicina con ninguno de los siguientes medicamentos:  ciertos medicamentos para infecciones micóticas, tales jona fluconazol, itraconazol, ketoconazol, posaconazol, voriconazol cisaprida dofetilida dronedarona halofantrina levometadil IMAO, tales joan Carbex, Eldepryl, Marplan, Nardil y Parnate  medicamentos narcóticos para la tos pimozida tioridazina ziprasidona  Esta medicina también puede interactuar con los siguientes medicamentos:  alcohol antihistamínicos para alergia, tos y resfrío ciertos medicamentos para la ansiedad o para conciliar el sueño ciertos medicamentos para el cáncer ciertos medicamentos para la depresión, joan amitriptilina, fluoxetina, sertralina ciertos medicamentos para las infecciones, tales joan alfuzosín, cloroquina, claritromicina, levofloxacina, mefloquina, pentamidina, troleandomicina ciertos medicamentos para el pulso cardiaco irregular ciertos medicamentos para convulsiones, tales joan fenobarbital, primidona medios de contraste anestésicos generales, tales joan halotano, isoflurano, metoxiflurano, propofol anestésicos locales, tales joan lidocaína, pramoxina, tetracaína medicamentos para relajar los músculos antes de geena cirugía medicamentos narcóticos para el dolor otros medicamentos que prolongan el intervalo QT (causan un ritmo cardiaco anormal) fenotiazinas, tales joan clorpromazina, mesoridazina, proclorperazina  Puede ser que esta lista no menciona todas las posibles interacciones. Informe a burton profesional de la keshav de todos los productos a base de hierbas, medicamentos de venta daysi o suplementos nutritivos que esté tomando. Si usted fuma, consume bebidas alcohólicas o si utiliza drogas ilegales, indíqueselo también a burton profesional de la keshav. Algunas sustancias pueden interactuar con burton medicamento.  ¿A qué fidencio estar atento al usar isac medicamento?  Informe a burton médico o a burton profesional de la keshav si arielle síntomas no comienzan a mejorar o si empeoran.  Puede experimentar somnolencia o mareos. No conduzca, no utilice maquinaria ni cornelius nada que le exija permanecer en estado de alerta hasta que sepa cómo le afecta isac medicamento. No se siente ni se ponga de pie con rapidez, especialmente si es un paciente de edad avanzada. [place] reduce el riesgo de mareos o  desmayos. El alcohol puede interferir con el efecto de isac medicamento. Evite consumir bebidas alcohólicas.  Si está tomando otro medicamento que también causa somnolencia, es posible que tenga más efectos secundarios. Entréguele a burton proveedor de atención médica geena lista de todos los medicamentos que usa. [place] médico le dirá cuánto medicamento hari. No tome más medicamento que lo indicado. Llame al servicio de emergencias para recibir ayuda si tiene problemas para respirar o somnolencia inusual.  Se le podría secar la boca. Masticar chicle sin azúcar, chupar caramelos duros y hari agua en abundancia lo ayudará a mantener la boca húmeda. Si el problema no desaparece o es severo, consulte a burton médico.  ¿Qué efectos secundarios puedo tener al utilizar isac medicamento?  Efectos secundarios que debe informar a burton médico o a burton profesional de la keshav tan pronto joan sea posible:  reacciones alérgicas, joan erupción cutánea, comezón/picazón o urticarias, hinchazón de la chintan, los labios o la lengua problemas respiratorios dolor en el pecho ritmo cardiaco rápido, irregular alucinaciones convulsiones cansancio o debilidad inusual  Efectos secundarios que generalmente no requieren atención médica (debe informarlos a burton médico o a burton profesional de la keshav si persisten o si son molestos):  dolor de marta náuseas, vómito  Puede ser que esta lista no menciona todos los posibles efectos secundarios. Comuníquese a burton médico por asesoramiento médico sobre los efectos secundarios. Usted puede informar los efectos secundarios a la FDA por teléfono al [phone]-[phone].  ¿Dónde fidencio guardar mi medicina?  [place] fuera del alcance de los niños.  Guárdela a temperatura ambiente, entre 15 y 30 grados C (59 y 86 grados F). Mantenga el envase julio cesar cerrado. Deseche todo el medicamento que no haya utilizado, después de la fecha de vencimiento.  ATENCIÓN: Isac folleto es un resumen. Puede ser que no cubra toda la posible información.  Si usted tiene preguntas acerca de esta medicina, consulte con burton médico, burton farmacéutico o burton profesional de la keshav.  © 2018 Elsevier/Gold Standard (2016-11-15 00:00:00)      Oxycodone tablets or capsules  What is this medicine?  OXYCODONE (ox i KOE done) is a pain reliever. It is used to treat moderate to severe pain.  This medicine may be used for other purposes; ask your health care provider or pharmacist if you have questions.  COMMON BRAND NAME(S): Dazidox, Endocodone, Oxaydo, OXECTA, OxyIR, Percolone, Roxicodone, ROXYBOND  What should I tell my health care provider before I take this medicine?  They need to know if you have any of these conditions:  -Taliaferro's disease  -brain tumor  -head injury  -heart disease  -history of drug or alcohol abuse problem  -if you often drink alcohol  -kidney disease  -liver disease  -lung or breathing disease, like asthma  -mental illness  -pancreatic disease  -seizures  -thyroid disease  -an unusual or allergic reaction to oxycodone, codeine, hydrocodone, morphine, other medicines, foods, dyes, or preservatives  -pregnant or trying to get pregnant  -breast-feeding  How should I use this medicine?  Take this medicine by mouth with a glass of water. Follow the directions on the prescription label. You can take it with or without food. If it upsets your stomach, take it with food. Take your medicine at regular intervals. Do not take it more often than directed. Do not stop taking except on your doctor's advice.  Some brands of this medicine, like Oxecta, have special instructions. Ask your doctor or pharmacist if these directions are for you: Do not cut, crush or chew this medicine. Swallow only one tablet at a time. Do not wet, soak, or lick the tablet before you take it.  A special MedGuide will be given to you by the pharmacist with each prescription and refill. Be sure to read this information carefully each time.  Talk to your pediatrician regarding the use of this  medicine in children. Special care may be needed.  Overdosage: If you think you have taken too much of this medicine contact a poison control center or emergency room at once.  NOTE: This medicine is only for you. Do not share this medicine with others.  What if I miss a dose?  If you miss a dose, take it as soon as you can. If it is almost time for your next dose, take only that dose. Do not take double or extra doses.  What may interact with this medicine?  This medicine may interact with the following medications:  -alcohol  -antihistamines for allergy, cough and cold  -antiviral medicines for HIV or AIDS  -atropine  -certain antibiotics like clarithromycin, erythromycin, linezolid, rifampin  -certain medicines for anxiety or sleep  -certain medicines for bladder problems like oxybutynin, tolterodine  -certain medicines for depression like amitriptyline, fluoxetine, sertraline  -certain medicines for fungal infections like ketoconazole, itraconazole, voriconazole  -certain medicines for migraine headache like almotriptan, eletriptan, frovatriptan, naratriptan, rizatriptan, sumatriptan, zolmitriptan  -certain medicines for nausea or vomiting like dolasetron, ondansetron, palonosetron  -certain medicines for Parkinson's disease like benztropine, trihexyphenidyl  -certain medicines for seizures like phenobarbital, phenytoin, primidone  -certain medicines for stomach problems like dicyclomine, hyoscyamine  -certain medicines for travel sickness like scopolamine  -diuretics  -general anesthetics like halothane, isoflurane, methoxyflurane, propofol  -ipratropium  -local anesthetics like lidocaine, pramoxine, tetracaine  -MAOIs like Carbex, Eldepryl, Marplan, Nardil, and Parnate  -medicines that relax muscles for surgery  -methylene blue  -nilotinib  -other narcotic medicines for pain or cough  -phenothiazines like chlorpromazine, mesoridazine, prochlorperazine, thioridazine  This list may not describe all possible  interactions. Give your health care provider a list of all the medicines, herbs, non-prescription drugs, or dietary supplements you use. Also tell them if you smoke, drink alcohol, or use illegal drugs. Some items may interact with your medicine.  What should I watch for while using this medicine?  Tell your doctor or health care professional if your pain does not go away, if it gets worse, or if you have new or a different type of pain. You may develop tolerance to the medicine. Tolerance means that you will need a higher dose of the medicine for pain relief. Tolerance is normal and is expected if you take this medicine for a long time.  Do not suddenly stop taking your medicine because you may develop a severe reaction. Your body becomes used to the medicine. This does NOT mean you are addicted. Addiction is a behavior related to getting and using a drug for a non-medical reason. If you have pain, you have a medical reason to take pain medicine. Your doctor will tell you how much medicine to take. If your doctor wants you to stop the medicine, the dose will be slowly lowered over time to avoid any side effects.  There are different types of narcotic medicines (opiates). If you take more than one type at the same time or if you are taking another medicine that also causes drowsiness, you may have more side effects. Give your health care provider a list of all medicines you use. Your doctor will tell you how much medicine to take. Do not take more medicine than directed. Call emergency for help if you have problems breathing or unusual sleepiness.  You may get drowsy or dizzy. Do not drive, use machinery, or do anything that needs mental alertness until you know how the medicine affects you. Do not stand or sit up quickly, especially if you are an older patient. This reduces the risk of dizzy or fainting spells. Alcohol may interfere with the effect of this medicine. Avoid alcoholic drinks.  This medicine will cause  constipation. Try to have a bowel movement at least every 2 to 3 days. If you do not have a bowel movement for 3 days, call your doctor or health care professional.  Your mouth may get dry. Chewing sugarless gum or sucking hard candy, and drinking plenty of water may help. Contact your doctor if the problem does not go away or is severe.  What side effects may I notice from receiving this medicine?  Side effects that you should report to your doctor or health care professional as soon as possible:  -allergic reactions like skin rash, itching or hives, swelling of the face, lips, or tongue  -breathing problems  -confusion  -signs and symptoms of low blood pressure like dizziness; feeling faint or lightheaded, falls; unusually weak or tired  -trouble passing urine or change in the amount of urine  -trouble swallowing  Side effects that usually do not require medical attention (report to your doctor or health care professional if they continue or are bothersome):  -constipation  -dry mouth  -nausea, vomiting  -tiredness  This list may not describe all possible side effects. Call your doctor for medical advice about side effects. You may report side effects to FDA at 5-336-FDA-3830.  Where should I keep my medicine?  Keep out of the reach of children. This medicine can be abused. Keep your medicine in a safe place to protect it from theft. Do not share this medicine with anyone. Selling or giving away this medicine is dangerous and against the law.  Store at room temperature between 15 and 30 degrees C (59 and 86 degrees F). Protect from light. Keep container tightly closed.  This medicine may cause accidental overdose and death if it is taken by other adults, children, or pets. Flush any unused medicine down the toilet to reduce the chance of harm. Do not use the medicine after the expiration date.  NOTE: This sheet is a summary. It may not cover all possible information. If you have questions about this medicine, talk  to your doctor, pharmacist, or health care provider.  © 2018 Elsevier/Gold Standard (2016-11-01 16:55:57)        Oxycodone tablets or capsules  ¿Qué es isac medicamento?  La OXICODONA es un analgésico. Se utiliza para tratar los yo moderados a severos.  Isac medicamento puede ser utilizado para otros usos; si tiene alguna pregunta consulte con burton proveedor de atención médica o con burton farmacéutico.  MARCAS COMUNES: Dazidox, Endocodone, Oxaydo, OXECTA, OxyIR, Percolone, Roxicodone, ROXYBOND  ¿Qué le fidencio informar a mi profesional de la keshav antes de hari isac medicamento?  Necesitan saber si usted presenta alguno de los siguientes problemas o situaciones:  enfermedad de [place] tumor cerebral lesión de la marta enfermedad cardiaca antecedentes de abuso de alcohol o drogas si guerita alcohol con frecuencia enfermedad renal enfermedad hepática enfermedad pulmonar o respiratoria, joan asma trastorno mental enfermedad pancreática convulsiones enfermedad tiroidea geena reacción inusual o alérgica a la oxicodona, codeína, hidrocodona, morfina, a otros medicamentos, alimentos, colorantes o conservantes si está embarazada o buscando quedar embarazada si está amamantando a un bebé  ¿Cómo fidencio utilizar isac medicamento?  [place] isac medicamento por vía oral con un vaso de agua. Siga las instrucciones de la etiqueta del medicamento. Usted puede tomarlo con o sin alimentos. Si el medicamento le produce malestar estomacal, tómelo con alimentos. [place] arielle dosis a intervalos regulares. No tome burton medicamento con geena frecuencia mayor a la indicada. No deje de tomarlo excepto si así lo indica burton médico.  Algunas marcas de isac medicamento, marlys joan Oxecta, tienen instrucciones especiales. Pregunte a burton médico o burton profesional de la keshav si estas instrucciones se aplican a usted: No julien, triture ni mástique isac medicamento. [place] solamente geena tableta a la vez. No mojar, empapar o lamer la tableta antes de tomarla.  Hable con burton  pediatra para informarse acerca del uso de isac medicamento en niños. Puede requerir atención especial.  Sobredosis: Póngase en contacto inmediatamente con un centro toxicológico o geena jose de urgencia si usted juan carlos que haya tomado demasiado medicamento.  ATENCIÓN: Isac medicamento es solo para usted. No comparta isac medicamento con nadie.  ¿Qué sucede si me olvido de geena dosis?  Si olvida geena dosis, tómela lo antes posible. Si es darling la hora de la próxima dosis, tome sólo lucy dosis. No tome dosis adicionales o dobles.  ¿Qué puede interactuar con isac medicamento?  Esta medicina puede interactuar con los siguientes medicamentos:  alcohol antihistamínicos para alergia, tos y resfrío medicamentos antivirales para el VIH o SIDA atropina ciertos antibióticos, tales joan claritromicina, eritromicina, linezolida, rifampicina ciertos medicamentos para la ansiedad o para conciliar el sueño ciertos medicamentos para problemas de vejiga, tales joan oxibutinina, tolterodina ciertos medicamentos para la depresión, joan amitriptilina, fluoxetina, sertralina ciertos medicamentos para infecciones micóticas, tales joan quetoconazol, itraconazol, voriconazol ciertos medicamentos para la migraña, tales joan almotriptán, eletriptán, frovatriptán, naratriptán, rizatriptán, sumatriptán, zolmitriptán ciertos medicamentos para las náuseas o los vómitos, tales joan dolasetrón, ondansetrón, palonosetrón ciertos medicamentos para el mal de Parkinson, tales joan benzatropina, trihexifenidilo ciertos medicamentos para convulsiones, tales joan fenobarbital, fenitoína, primidona ciertos medicamentos para problemas estomacales, tales joan diciclomina, hiosciamina ciertos medicamentos para el mareo por movimiento, marlys joan escopolamina diuréticos anestésicos generales, tales joan halotano, isoflurano, metoxiflurano, propofol ipratropio anestésicos locales, tales joan lidocaína, pramoxina, tetracaína IMAO, tales joan Carbex, Eldepryl, Marplan,  [place] y [place] medicamentos para relajar los músculos antes de geena cirugía michael de metileno nilotinib otros medicamentos narcóticos para el dolor o la tos fenotiazinas, tales joan clorpromazina, mesoridazina, proclorperazina, tioridazina  Puede ser que esta lista no menciona todas las posibles interacciones. Informe a burton profesional de la keshav de todos los productos a base de hierbas, medicamentos de venta daysi o suplementos nutritivos que esté tomando. Si usted fuma, consume bebidas alcohólicas o si utiliza drogas ilegales, indíqueselo también a burton profesional de la keshav. Algunas sustancias pueden interactuar con burton medicamento.  ¿A qué fidencio estar atento al usar isac medicamento?  Si el dolor no desaparece, si empeora o si experimenta un dolor nuevo o de tipo diferente, consulte a burton médico o a burton profesional de la keshav. Usted puede desarrollar tolerancia al medicamento. La tolerancia significa que necesitará geena dosis más nik para aliviar el dolor. Tolerancia es normal y esperada cuando esté tomando isac medicamento por un faby período de tiempo.  No suspenda el uso de burton medicamento repentinamente debido a que puede desarrollar geena reacción severa. [place] cuerpo se acostumbra a isac medicamento. [place] NO significa que sea adicto. La adicción es un comportamiento que hace referencia a la obtención y utilización de un medicamento con fines que no son médicos. Si tiene dolor, existe geena razón médica para que usted tome un analgésico. [place] médico le indicará la cantidad de medicamento que necesitará hari. Si burton médico desea que pare el tratamiento, la dosis será reducida gradualmente para evitar efectos secundarios.  Puede experimentar somnolencia o mareos al comenzar con el medicamento o al cambiar de dosis. No conduzca ni utilice maquinaria ni cornelius nada que sea peligroso hasta que sepa cómo le afecta isac medicamento. Siéntese y póngase de pie lentamente.  Hay distintos tipos de medicamentos narcóticos (opiáceos)  para el dolor. Si usted loli más que un tipo a la misma vez, podrá tener más efectos secundarios. Sacha a burton proveedor de atención medica geena lista de todos los medicamentos que usted usa. [place] médico le informará la cantidad de medicamento que necesita ann. No tome más medicamento que lo indicado. Comuníquese con emergencia para ayuda si tiene problemas para respirar.  Isac medicamento causará estreñimiento. Trate de evacuar los intestinos al menos cada 2 ó 3 días. Si no evacua los intestinos sg 3 días, comuníquese con burton médico o con burton profesional de la keshav.  Se le podrá secar la boca. Ann agua en abundancia, masticar chicle sin azúcar y chupar caramelos duros le ayudarán. Visite a burton dentista cada 6 meses.  ¿Qué efectos secundarios puedo tener al utilizar isac medicamento?  Efectos secundarios que debe informar a burton médico o a [place]on profesional de la keshav tan pronto joan sea posible:  -reacciones alérgicas joan erupción cutánea, picazón o urticarias, hinchazón de la chintan, labios o lengua  -problemas respiratorios  -confusión  -sensación de desmayos o aturdimiento, caídas  -dificultad para orinar o cambios en el volumen de orina  -cansancio o debilidad inusual  Efectos secundarios que, por lo general, no requieren atención médica (debe informarlos a burton médico o a burton profesional de la keshav si persisten o si son molestos):  -estreñimiento  -boca seca  -picazón  -náuseas, vómitos  -malestar estomacal  Puede ser que esta lista no menciona todos los posibles efectos secundarios. Comuníquese a burton médico por asesoramiento médico sobre los efectos secundarios. Usted puede informar los efectos secundarios a la FDA por teléfono al 1-800FDA-6412.  ¿Dónde fidencio guardar mi medicina?  [place]anténgala fuera del alcance de los niños. Isac medicamento puede ser abusado. Mantenga burton medicamento en un lugar seguro para protegerlo contra robos. No comparta isac medicamento con nadie. Es peligroso  o regalar isac medicamento y  está prohibido por la dacia.  Guárdelo a temperatura ambiente, entre 15 y 30 grados C (59 y 86 grados F). Protéjalo nickie. Mantenga el envase julio cesar cerrado.  Temitope medicamento puede causar sobredosis accidental y muerte si otros adultos, niños o mascotas se lo josette. Tire por el inodoro cualquier medicamento que no haya utilizado para reducir la posibilidad de daño. No utilice temitope medicamento después de la fecha de vencimiento.  ATENCIÓN: Temitope folleto es un resumen. Puede ser que no cubra toda la posible información. Si usted tiene preguntas acerca de esta medicina, consulte con burton médico, burton farmacéutico o burton profesional de la keshav.  © 2018 Elsevier/Gold Standard (2016-10-19 00:00:00)

## 2019-10-22 NOTE — CARE PLAN
Problem: Safety  Goal: Will remain free from falls  Outcome: PROGRESSING AS EXPECTED  Note:   Hourly rounding.  Non-skid socks. Bed locked & in low position. Personal belongings and call light  within reach. .      Problem: Knowledge Deficit  Goal: Knowledge of disease process/condition, treatment plan, diagnostic tests, and medications will improve  Outcome: PROGRESSING AS EXPECTED  Note:   Information regarding disease process/condition explained,question answered.  Updated with plan of care, verbalized understanding.

## 2019-10-22 NOTE — PROGRESS NOTES
Breakthrough pain x 1 oxycodone PS 5/10 surgical site.  Patient had showered last noc,  hemovac got pulled while standing up from commode.  No drainage, no output.  No complain of SOB, no chest pain no respiratory distress.  Ambulating with FWW without difficulty.  Family at bedside.

## 2019-10-22 NOTE — DISCHARGE PLANNING
ATTN: Case Management  RE: Referral for Home Health    As of 10/22/19, we have accepted the Home Health referral for the patient listed above.    A [facility] clinician will be out to see the patient within 48 hours. If you have any questions or concerns regarding the patient’s transition to Home Health, please do not hesitate to contact us at [phone].      We look forward to collaborating with you,  [facility] Health Team

## 2019-10-22 NOTE — THERAPY
"Occupational Therapy Treatment completed with focus on ADLs, ADL transfers, patient education and caregiver training.  Functional Status:  Spv w/bed mobility, min A w/LB dressing, spv w/sit>stand walking in room w/fww, completed toilet txf w/spv and hygiene, stood at sink for grooming required min verbal cues to maintain spinal precautions did appear to fatigue w/standing tasks, min A to safely txf to chair, again min verbal cues for use of fww and txf. Appeared to have pain and fatigue requested BTB. Spv w/log roll and min verbal cues. Family present, requested Fww RN notified   Plan of Care: Will benefit from Occupational Therapy 4 times per week  Discharge Recommendations:  Equipment Front-Wheel Walker. Post-acute therapy Recommend home health transitional care for continued occupational therapy services.       See \"Rehab Therapy-Acute\" Patient Summary Report for complete documentation.     Pt seen for OT tx making steady progress still having post op pain and requires cues for spinal precautions recommend HH   "

## 2019-10-22 NOTE — PROGRESS NOTES
Seeing sitting in a chair at bedside.Alert and oriented  to person,place and time.on room air sat 92%. Denied pain at this time.Due medication given per MAR. States understanding of POC.call light at reach, advised to call for assistance, family members at bedside.

## 2019-10-23 RX ORDER — IBUPROFEN 600 MG/1
600 TABLET ORAL EVERY 6 HOURS PRN
Qty: 30 TAB | Refills: 0 | Status: SHIPPED | OUTPATIENT
Start: 2019-10-23 | End: 2019-10-30

## 2019-10-23 NOTE — DISCHARGE SUMMARY
Hospital Medicine Discharge Note     Admit Date:  10/17/2019       Discharge Date:   10/22/2019    Attending Physician:  Yonas Raman M.D.      Diagnoses (includes active and resolved):     Principal Problem:    Epidural hematoma (HCC) POA: Unknown  Active Problems:    Hypertension POA: Yes    Dyslipidemia POA: Yes    Hypothyroid POA: Yes    Hypokalemia POA: Unknown    IFG (impaired fasting glucose) POA: Yes  Resolved Problems:    Hypokalemia     Hospital Summary (Brief Narrative):         74 y.o. female history of hypertension dyslipidemia admitted  10/17/2019 with  weakness in her right leg and low back pain.  She had sharp radiating down her right lower extremity and right buttock associated with numbness in her right calf and her right foot.  No perineal numbness no change in urine or bowel habits.  She had finding of epidural hematoma of unclear cause.  She was not on blood thinners or anti platelets.  Patient underwent Lumbar laminectomy and foraminotomies by Dr. Cruz on 10-18-19. Post surgery, she had improved strength in her legs and decreased lower back pain and leg numbness.  Hemovac was removed.   She had low blood pressure and her Beta blocker was stopped. Blood pressure remained stable in the 150s- to 120s systolic.  Recommended to continue her lisinopril and start lopressor at a lower dose following discharge.  Her gluteal muscles and lower extremity strength significantly improved.  Given much clinical improvement in her pain, strength and function ,  patient was cleared for discharge by surgery. Home health and walker arranged.  Following discharge while arrangements being made to go home, her vitals were re checked and surprisingly she had a fever of  102.7.  She had no tachycardia.  Blood pressure was stable in the 120s.  No hypoxia. No shortness of breath, acute GI and urinary symptoms reported. Her back pain improved and there were no cellulitis changes.  Repeat temperatures declined to 99.2.   Possible post op atelectasis and she was encouraged to use Incentive spirometer.  Patient and family were anxious to go home despite offering for further workup. I felt patient was stable enough as long as she had close supervision.  Her WBC was normal. She had no other SIRS symptoms and was feeling markedly better. On day I discharge, I examined patient.  I discussed findings, plan of care and follow up.   I instructed the daughter to keep an eye on her temperature and return to the ER if increased pain, weakness or recurrent high fevers. She is intolerant to tylenol due to nausea and was instructed to take suppositories or ibuprofin for pain and fever symptoms.       Consultants:        MARK Davis    Imaging/ Testing:      EC-ECHOCARDIOGRAM COMPLETE W/O CONT   Final Result   No prior study is available for comparison.   Technically difficult study - adequate information is obtained.   Left ventricular ejection fraction is visually estimated to be 75%.  Probable mild concentric left ventricular hypertrophy.  The aortic valve is not well visualized.  Unable to estimate RVSP, normal RAP.   DX-PORTABLE FLUORO > 1 HOUR   Final Result      Portable fluoroscopy utilized for 6 seconds.         INTERPRETING LOCATION: [address] [place] [address], [facility]      DX-SPINE-ANY ONE VIEW   Final Result      Intraoperative images as described.      MR-LUMBAR SPINE-WITH   Final Result         1.  Large acute dorsal epidural hematoma causing marked compression of the thecal sac from the T12 level to the sacrum.      2.  No evidence of abnormal intradural or extradural enhancement in the lumbar region.      MR-LUMBAR SPINE-W/O   Final Result         1.  EXTENSIVE LARGE ACUTE DORSAL EPIDURAL HEMATOMA causing marked compression of the posterior aspect of the thecal sac from the T11 level to the sacrum.      These findings were discussed with Dr LADY WELSH at 11:10 AM 10/17/2019.      DX-LUMBAR SPINE-2 OR 3 VIEWS   Final Result          1.  No acute findings.      2.  Mild multilevel degenerative disc disease.      3.  Severe facet arthropathy in the lower lumbar spine.      4.  Minimal retrolisthesis at L2-3.      5.  Minimal anterolisthesis at L4-5 and L5-S1.            Procedures:               PATIENT NAME:  Desiree Salas   1945 MRN [id number]        PROCEDURE:  1.  Lumbar 1-5 laminectomy  2.  bilaterlal Lumbar 1/2-5/Sacral 1 foraminotomy     Surgeon:  Jose Cruz MD, PhD  Assistant:  SUMA Mijares     Anesthesia:  GETA     Diagnosis:  Lumbar epidural hematoma        Discharge Medications:             Medication List      START taking these medications      Instructions   cyclobenzaprine 10 MG Tabs  Commonly known as:  FLEXERIL   Take 1 Tab by mouth 3 times a day as needed for Mild Pain or Muscle Spasms.  Dose:  10 mg     ibuprofen 600 MG Tabs  Commonly known as:  MOTRIN   Take 1 Tab by mouth every 6 hours as needed for Mild Pain, Moderate Pain or Fever for up to 7 days.  Dose:  600 mg     oxyCODONE immediate-release 5 MG Tabs  Commonly known as:  ROXICODONE   Take 1 Tab by mouth every 6 hours as needed for Severe Pain for up to 7 days. Indications: Acute Pain  Dose:  5 mg     senna-docusate 8.6-50 MG Tabs  Commonly known as:  PERICOLACE or SENOKOT S   Take 2 Tabs by mouth 2 times a day as needed for Constipation.  Dose:  2 Tab        CHANGE how you take these medications      Instructions   metoprolol 25 MG Tabs  What changed:    · medication strength  · how much to take  Commonly known as:  LOPRESSOR   Take 2 Tabs by mouth 2 times a day.  Dose:  50 mg     valsartan 80 MG Tabs  What changed:    · medication strength  · how much to take  Commonly known as:  DIOVAN   Take 1 Tab by mouth every day.  Dose:  80 mg        CONTINUE taking these medications      Instructions   levothyroxine 112 MCG Tabs  Commonly known as:  SYNTHROID   Take 112 mcg by mouth Every morning on an empty stomach.  Dose:  112 mcg                Diet:       DIET ORDERS (From admission to next 24h)    None            Activity:   As tolerated.      Code status:   Full code    Primary Care Provider:    Susi Grant M.D.    Follow up appointment details :      .  [facility], Emergency Dept  [address] [facility] [id number] [phone]        Susi Grant M.D.  [address]  [address]  [facility] [id number] [phone]    In 2 weeks      Jose Cruz M.D.  [address] [facility] [id number] [phone]    In 1 week              Time spent on discharge day patient visit: 40 minutes    #################################################

## 2019-10-23 NOTE — PROGRESS NOTES
Discharge document and RX was Provided and explained to pt. Verbalized understanding. Iv removed, tip intact. Pt discharged home with all personal belongings

## 2019-10-23 NOTE — PROGRESS NOTES
Nurses Mobility note:      Surgery patient?: Yes  Date of surgery: 10/18/2019  Ambulated 50 ft on day 4 of surgery? Yes  Number of times ambulated 50 feet or greater today: 6  Patient has been up to chair, edge of bed or HOB 90 degrees for all meals?:Yes   Goal met? Yes.

## 2019-10-24 ENCOUNTER — HOME CARE VISIT (OUTPATIENT)
Dept: HOME HEALTH SERVICES | Facility: HOME HEALTHCARE | Age: 74
End: 2019-10-24
Payer: COMMERCIAL

## 2019-10-24 VITALS
OXYGEN SATURATION: 98 % | TEMPERATURE: 98.7 F | SYSTOLIC BLOOD PRESSURE: 128 MMHG | HEART RATE: 90 BPM | BODY MASS INDEX: 31.88 KG/M2 | DIASTOLIC BLOOD PRESSURE: 62 MMHG | HEIGHT: 59 IN | WEIGHT: 158.13 LBS | RESPIRATION RATE: 16 BRPM

## 2019-10-24 PROCEDURE — 665001 SOC-HOME HEALTH

## 2019-10-24 PROCEDURE — G0493 RN CARE EA 15 MIN HH/HOSPICE: HCPCS

## 2019-10-24 ASSESSMENT — PATIENT HEALTH QUESTIONNAIRE - PHQ9
2. FEELING DOWN, DEPRESSED, IRRITABLE, OR HOPELESS: 00
CLINICAL INTERPRETATION OF PHQ2 SCORE: 0
1. LITTLE INTEREST OR PLEASURE IN DOING THINGS: 00

## 2019-10-24 ASSESSMENT — ENCOUNTER SYMPTOMS
NAUSEA: DENIES
VOMITING: DENIES

## 2019-10-24 ASSESSMENT — ACTIVITIES OF DAILY LIVING (ADL)
OASIS_M1830: 03
HOME_HEALTH_OASIS: 01

## 2019-10-28 ENCOUNTER — ANTICOAGULATION MONITORING (OUTPATIENT)
Dept: MEDICAL GROUP | Facility: PHYSICIAN GROUP | Age: 74
End: 2019-10-28

## 2019-10-28 NOTE — PROGRESS NOTES
Received referral from [facility]. Medications reviewed. No clinically significant interactions noted.

## 2019-10-29 ENCOUNTER — HOME CARE VISIT (OUTPATIENT)
Dept: HOME HEALTH SERVICES | Facility: HOME HEALTHCARE | Age: 74
End: 2019-10-29
Payer: COMMERCIAL

## 2019-10-29 PROCEDURE — G0299 HHS/HOSPICE OF RN EA 15 MIN: HCPCS

## 2019-10-30 VITALS
SYSTOLIC BLOOD PRESSURE: 130 MMHG | DIASTOLIC BLOOD PRESSURE: 80 MMHG | RESPIRATION RATE: 16 BRPM | TEMPERATURE: 97 F | HEART RATE: 90 BPM | OXYGEN SATURATION: 96 %

## 2019-10-30 ASSESSMENT — ENCOUNTER SYMPTOMS
NAUSEA: DENIES
VOMITING: DENIES

## 2019-10-31 ENCOUNTER — HOME CARE VISIT (OUTPATIENT)
Dept: HOME HEALTH SERVICES | Facility: HOME HEALTHCARE | Age: 74
End: 2019-10-31
Payer: COMMERCIAL

## 2019-10-31 PROCEDURE — G0151 HHCP-SERV OF PT,EA 15 MIN: HCPCS

## 2019-11-01 ENCOUNTER — HOME CARE VISIT (OUTPATIENT)
Dept: HOME HEALTH SERVICES | Facility: HOME HEALTHCARE | Age: 74
End: 2019-11-01
Payer: COMMERCIAL

## 2019-11-01 VITALS
RESPIRATION RATE: 16 BRPM | OXYGEN SATURATION: 96 % | HEART RATE: 105 BPM | SYSTOLIC BLOOD PRESSURE: 140 MMHG | TEMPERATURE: 97.5 F | DIASTOLIC BLOOD PRESSURE: 80 MMHG

## 2019-11-01 ASSESSMENT — ACTIVITIES OF DAILY LIVING (ADL)
ADLS_COMMENTS: <!--EPICS-->REFER TO OT EVALUATION<!--EPICE-->
IADLS_COMMENTS: <!--EPICS-->REFER TO OT EVALUATION<!--EPICE-->
TRANSPORTATION COMMENTS: REQUIRES ASSISTANCE TO LEAVE THE HOME

## 2019-11-02 SDOH — ECONOMIC STABILITY: HOUSING INSECURITY
HOME SAFETY: PT LIVES IN A SINGLE STORY HOME WITH HER DAUGHTER AND SPOUSE; 2 STEPS TO ENTER THE HOME; PT RECEIVES ASSISTANCE FROM HER FAMILY AS NEEDED; HOME IS CLEAN, WITH CLEAR PATHWAYS TO ACCESS ALL AREAS OF THE HOME AS NEEDED.

## 2019-11-02 ASSESSMENT — ENCOUNTER SYMPTOMS: MENTAL STATUS CHANGE: 0

## 2019-11-04 ENCOUNTER — HOME CARE VISIT (OUTPATIENT)
Dept: HOME HEALTH SERVICES | Facility: HOME HEALTHCARE | Age: 74
End: 2019-11-04
Payer: COMMERCIAL

## 2019-11-04 PROCEDURE — G0151 HHCP-SERV OF PT,EA 15 MIN: HCPCS

## 2019-11-05 ENCOUNTER — HOME CARE VISIT (OUTPATIENT)
Dept: HOME HEALTH SERVICES | Facility: HOME HEALTHCARE | Age: 74
End: 2019-11-05
Payer: COMMERCIAL

## 2019-11-05 VITALS
TEMPERATURE: 98.2 F | RESPIRATION RATE: 17 BRPM | OXYGEN SATURATION: 97 % | SYSTOLIC BLOOD PRESSURE: 140 MMHG | HEART RATE: 73 BPM | DIASTOLIC BLOOD PRESSURE: 78 MMHG

## 2019-11-05 PROCEDURE — G0299 HHS/HOSPICE OF RN EA 15 MIN: HCPCS

## 2019-11-06 VITALS
DIASTOLIC BLOOD PRESSURE: 82 MMHG | TEMPERATURE: 98.1 F | RESPIRATION RATE: 16 BRPM | OXYGEN SATURATION: 94 % | SYSTOLIC BLOOD PRESSURE: 130 MMHG | HEART RATE: 80 BPM

## 2019-11-06 ASSESSMENT — ENCOUNTER SYMPTOMS
NAUSEA: DENIES
MUSCLE WEAKNESS: 1
VOMITING: DENIES

## 2019-11-06 ASSESSMENT — ACTIVITIES OF DAILY LIVING (ADL)
CURRENT_FUNCTION: STAND BY ASSIST
AMBULATION ASSISTANCE: STAND BY ASSIST

## 2019-11-07 ENCOUNTER — HOME CARE VISIT (OUTPATIENT)
Dept: HOME HEALTH SERVICES | Facility: HOME HEALTHCARE | Age: 74
End: 2019-11-07
Payer: COMMERCIAL

## 2019-11-07 VITALS
HEART RATE: 85 BPM | RESPIRATION RATE: 17 BRPM | OXYGEN SATURATION: 95 % | DIASTOLIC BLOOD PRESSURE: 82 MMHG | TEMPERATURE: 98.8 F | SYSTOLIC BLOOD PRESSURE: 138 MMHG

## 2019-11-07 PROCEDURE — G0151 HHCP-SERV OF PT,EA 15 MIN: HCPCS

## 2019-11-07 PROCEDURE — G0299 HHS/HOSPICE OF RN EA 15 MIN: HCPCS

## 2019-11-11 ENCOUNTER — HOME CARE VISIT (OUTPATIENT)
Dept: HOME HEALTH SERVICES | Facility: HOME HEALTHCARE | Age: 74
End: 2019-11-11
Payer: COMMERCIAL

## 2019-11-11 VITALS
DIASTOLIC BLOOD PRESSURE: 80 MMHG | TEMPERATURE: 98.3 F | SYSTOLIC BLOOD PRESSURE: 134 MMHG | RESPIRATION RATE: 17 BRPM | HEART RATE: 61 BPM | OXYGEN SATURATION: 96 %

## 2019-11-11 PROCEDURE — G0151 HHCP-SERV OF PT,EA 15 MIN: HCPCS

## 2019-11-12 VITALS
RESPIRATION RATE: 17 BRPM | SYSTOLIC BLOOD PRESSURE: 138 MMHG | DIASTOLIC BLOOD PRESSURE: 82 MMHG | OXYGEN SATURATION: 95 % | TEMPERATURE: 98.8 F | HEART RATE: 85 BPM

## 2019-11-13 ENCOUNTER — HOME CARE VISIT (OUTPATIENT)
Dept: HOME HEALTH SERVICES | Facility: HOME HEALTHCARE | Age: 74
End: 2019-11-13
Payer: COMMERCIAL

## 2019-11-13 PROCEDURE — G0151 HHCP-SERV OF PT,EA 15 MIN: HCPCS

## 2019-11-14 VITALS
RESPIRATION RATE: 17 BRPM | OXYGEN SATURATION: 95 % | HEART RATE: 68 BPM | DIASTOLIC BLOOD PRESSURE: 78 MMHG | SYSTOLIC BLOOD PRESSURE: 145 MMHG | TEMPERATURE: 98.2 F

## 2019-11-18 ENCOUNTER — HOME CARE VISIT (OUTPATIENT)
Dept: HOME HEALTH SERVICES | Facility: HOME HEALTHCARE | Age: 74
End: 2019-11-18
Payer: COMMERCIAL

## 2019-11-18 VITALS
OXYGEN SATURATION: 95 % | TEMPERATURE: 98.3 F | HEART RATE: 73 BPM | RESPIRATION RATE: 17 BRPM | SYSTOLIC BLOOD PRESSURE: 140 MMHG | DIASTOLIC BLOOD PRESSURE: 82 MMHG

## 2019-11-18 PROCEDURE — G0151 HHCP-SERV OF PT,EA 15 MIN: HCPCS

## 2019-11-20 ENCOUNTER — HOME CARE VISIT (OUTPATIENT)
Dept: HOME HEALTH SERVICES | Facility: HOME HEALTHCARE | Age: 74
End: 2019-11-20
Payer: COMMERCIAL

## 2019-11-20 PROCEDURE — G0151 HHCP-SERV OF PT,EA 15 MIN: HCPCS

## 2019-11-21 ENCOUNTER — HOME CARE VISIT (OUTPATIENT)
Dept: HOME HEALTH SERVICES | Facility: HOME HEALTHCARE | Age: 74
End: 2019-11-21
Payer: COMMERCIAL

## 2019-11-23 VITALS
SYSTOLIC BLOOD PRESSURE: 142 MMHG | OXYGEN SATURATION: 96 % | RESPIRATION RATE: 17 BRPM | TEMPERATURE: 98.6 F | HEART RATE: 78 BPM | DIASTOLIC BLOOD PRESSURE: 86 MMHG

## 2019-11-25 ENCOUNTER — HOME CARE VISIT (OUTPATIENT)
Dept: HOME HEALTH SERVICES | Facility: HOME HEALTHCARE | Age: 74
End: 2019-11-25
Payer: COMMERCIAL

## 2019-11-25 VITALS
DIASTOLIC BLOOD PRESSURE: 88 MMHG | SYSTOLIC BLOOD PRESSURE: 158 MMHG | OXYGEN SATURATION: 94 % | HEART RATE: 87 BPM | RESPIRATION RATE: 17 BRPM | TEMPERATURE: 98.6 F

## 2019-11-25 PROCEDURE — G0151 HHCP-SERV OF PT,EA 15 MIN: HCPCS

## 2019-11-25 PROCEDURE — G0155 HHCP-SVS OF CSW,EA 15 MIN: HCPCS

## 2019-11-26 ENCOUNTER — HOME CARE VISIT (OUTPATIENT)
Dept: HOME HEALTH SERVICES | Facility: HOME HEALTHCARE | Age: 74
End: 2019-11-26
Payer: COMMERCIAL

## 2019-11-26 VITALS
TEMPERATURE: 98.4 F | OXYGEN SATURATION: 95 % | RESPIRATION RATE: 17 BRPM | SYSTOLIC BLOOD PRESSURE: 162 MMHG | DIASTOLIC BLOOD PRESSURE: 90 MMHG | HEART RATE: 75 BPM

## 2019-11-26 PROCEDURE — G0151 HHCP-SERV OF PT,EA 15 MIN: HCPCS

## 2019-11-27 ENCOUNTER — HOME CARE VISIT (OUTPATIENT)
Dept: HOME HEALTH SERVICES | Facility: HOME HEALTHCARE | Age: 74
End: 2019-11-27
Payer: COMMERCIAL

## 2019-12-04 ENCOUNTER — HOME CARE VISIT (OUTPATIENT)
Dept: HOME HEALTH SERVICES | Facility: HOME HEALTHCARE | Age: 74
End: 2019-12-04
Payer: COMMERCIAL

## 2019-12-04 PROCEDURE — G0151 HHCP-SERV OF PT,EA 15 MIN: HCPCS

## 2019-12-05 ENCOUNTER — HOME CARE VISIT (OUTPATIENT)
Dept: HOME HEALTH SERVICES | Facility: HOME HEALTHCARE | Age: 74
End: 2019-12-05
Payer: COMMERCIAL

## 2019-12-05 VITALS
OXYGEN SATURATION: 94 % | RESPIRATION RATE: 17 BRPM | SYSTOLIC BLOOD PRESSURE: 170 MMHG | HEART RATE: 68 BPM | TEMPERATURE: 98.1 F | DIASTOLIC BLOOD PRESSURE: 90 MMHG

## 2019-12-05 PROCEDURE — G0151 HHCP-SERV OF PT,EA 15 MIN: HCPCS

## 2019-12-05 ASSESSMENT — PATIENT HEALTH QUESTIONNAIRE - PHQ9: CLINICAL INTERPRETATION OF PHQ2 SCORE: 0

## 2019-12-05 ASSESSMENT — ACTIVITIES OF DAILY LIVING (ADL)
OASIS_M1830: 00
HOME_HEALTH_OASIS: 00

## 2019-12-07 VITALS
TEMPERATURE: 98.3 F | HEART RATE: 71 BPM | OXYGEN SATURATION: 95 % | RESPIRATION RATE: 17 BRPM | DIASTOLIC BLOOD PRESSURE: 90 MMHG | SYSTOLIC BLOOD PRESSURE: 172 MMHG

## 2020-04-30 ENCOUNTER — TELEMEDICINE (OUTPATIENT)
Dept: CARDIOLOGY | Facility: MEDICAL CENTER | Age: 75
End: 2020-04-30

## 2020-04-30 VITALS
WEIGHT: 160 LBS | HEIGHT: 56 IN | BODY MASS INDEX: 35.99 KG/M2 | SYSTOLIC BLOOD PRESSURE: 178 MMHG | DIASTOLIC BLOOD PRESSURE: 89 MMHG | HEART RATE: 83 BPM

## 2020-04-30 DIAGNOSIS — E78.5 DYSLIPIDEMIA: ICD-10-CM

## 2020-04-30 DIAGNOSIS — I10 HYPERTENSION, UNSPECIFIED TYPE: ICD-10-CM

## 2020-04-30 PROCEDURE — 99204 OFFICE O/P NEW MOD 45 MIN: CPT | Mod: 95,CR | Performed by: INTERNAL MEDICINE

## 2020-04-30 RX ORDER — AMLODIPINE BESYLATE VALSARTAN HYDROCHLOROTHIAZIDE 10; 25; 320 MG/1; MG/1; MG/1
1 TABLET, FILM COATED ORAL DAILY
Qty: 90 TAB | Refills: 3 | Status: SHIPPED | OUTPATIENT
Start: 2020-04-30 | End: 2020-05-01

## 2020-04-30 NOTE — PROGRESS NOTES
Cardiology Telemedicine Visit: New Patient   This encounter was conducted via Zoom .   Verbal consent was obtained. Patient's identity was verified.    Assessment and Plan:   PCP: Susi Grant M.D.  The following treatment plan was discussed:     1. Hypertension, unspecified type  Poorly controlled.  In place of valsartan I recommended Exforge HCT at maximum dosage.  She will check a basic metabolic panel in 1 week's time and continue home blood pressure monitoring.    2. Dyslipidemia  No recent results but elevated on the last reading.  We will repeat lipid profile in the next lab work      Follow up: Return in about 2 weeks (around 5/14/2020).    Subjective:     Chief Complaint   Patient presents with   • Hypertension     History: Desiree Pinto is a 74 y.o. female presenting for evaluation of hypertension.  She also has dyslipidemia and prior lipid profiles.  Blood pressure has been elevated since the fall 2019.  At that time she developed a spinal problem requiring surgery and became more sedentary for several months.  She is now recuperating from this and says she can walk for an hour without any cardiovascular symptoms such as chest pain, shortness of breath or near syncope.  She has taken a variety of blood pressure pills but all have failed to regulate the blood pressure.  She is currently taking valsartan 320 mg daily which she tolerates well but believes there is been no impact on the blood pressure.  In the history I see that she has been prescribed metoprolol as well in the past.  Average home blood pressures are between 160 and 180 systolic.        ROS  Denies any recent fevers or chills. No nausea or vomiting. No chest pains or shortness of breath. All other systems reviewed and negative except as per the HPI       Objective:   Vitals obtained by patient:  Respirations through observation: 12  BP (!) 178/89 (BP Location: Right arm, Patient Position: Sitting, BP Cuff Size: Adult)   Pulse  "83   Ht 1.422 m (4' 8\")   Wt 72.6 kg (160 lb)   LMP 02/05/2003   BMI 35.87 kg/m²     Physical Exam:  Constitutional: Alert, no distress, well-groomed.  Skin: No rashes in visible areas.  Eye: Round. Conjunctiva clear, lids normal. No icterus.   ENMT: Lips pink without lesions, good dentition, moist mucous membranes. Phonation normal.  Neck: No masses, no thyromegaly. Moves freely without pain.  CV: Pulse as reported by patient  Respiratory: Unlabored respiratory effort, no cough or audible wheeze  Psych: Alert and oriented x3, normal affect and mood.     Allergies   Allergen Reactions   • Pcn [Penicillins] Rash   • Tylenol [Acetaminophen]      Dizziness      Current medicines (including changes today)  Current Outpatient Medications   Medication Sig Dispense Refill   • amLODIPine-Valsartan-HCTZ -25 MG Tab Take 1 tablet by mouth every day. 90 Tab 3   • levothyroxine (SYNTHROID) 112 MCG Tab Take 112 mcg by mouth Every morning on an empty stomach.       No current facility-administered medications for this visit.      Patient Active Problem List    Diagnosis Date Noted   • Epidural hematoma (HCC) 10/17/2019     Priority: High   • Hypertension 06/16/2009     Priority: High   • IFG (impaired fasting glucose) 11/13/2012     Priority: Low   • Hypokalemia 10/17/2019   • Vitamin d deficiency 10/19/2010   • Hypothyroid 10/19/2010   • Dyslipidemia 06/16/2009     Family History   Problem Relation Age of Onset   • Heart Disease Father      She  has a past medical history of ASTHMA, Breast mass, Dyslipidemia, Heart attack (HCC), HTN, Post-menopausal, and Thyroid disease.  She  has a past surgical history that includes lumbar laminectomy diskectomy (Bilateral, 10/18/2019).  Past Medical History:   Diagnosis Date   • ASTHMA    • Breast mass     hx of breast mass   • Dyslipidemia    • Heart attack (HCC)    • HTN    • Post-menopausal    • Thyroid disease      Allergies   Allergen Reactions   • Pcn [Penicillins] Rash   • " Tylenol [Acetaminophen]      Dizziness      Outpatient Encounter Medications as of 4/30/2020   Medication Sig Dispense Refill   • amLODIPine-Valsartan-HCTZ -25 MG Tab Take 1 tablet by mouth every day. 90 Tab 3   • levothyroxine (SYNTHROID) 112 MCG Tab Take 112 mcg by mouth Every morning on an empty stomach.     • [DISCONTINUED] valsartan (DIOVAN) 80 MG Tab Take 1 Tab by mouth every day. (Patient taking differently: Take 320 mg by mouth every day.) 30 Tab 3   • [DISCONTINUED] cyclobenzaprine (FLEXERIL) 10 MG Tab Take 1 Tab by mouth 3 times a day as needed for Mild Pain or Muscle Spasms. (Patient not taking: Reported on 4/30/2020) 30 Tab 1   • [DISCONTINUED] senna-docusate (PERICOLACE OR SENOKOT S) 8.6-50 MG Tab Take 2 Tabs by mouth 2 times a day as needed for Constipation. (Patient not taking: Reported on 4/30/2020) 30 Tab 0   • [DISCONTINUED] metoprolol (LOPRESSOR) 25 MG Tab Take 2 Tabs by mouth 2 times a day. (Patient not taking: Reported on 4/30/2020) 60 Tab 1     No facility-administered encounter medications on file as of 4/30/2020.      Social History     Socioeconomic History   • Marital status:      Spouse name: Not on file   • Number of children: Not on file   • Years of education: Not on file   • Highest education level: Not on file   Occupational History   • Not on file   Social Needs   • Financial resource strain: Not on file   • Food insecurity     Worry: Not on file     Inability: Not on file   • Transportation needs     Medical: Not on file     Non-medical: Not on file   Tobacco Use   • Smoking status: Never Smoker   • Smokeless tobacco: Never Used   Substance and Sexual Activity   • Alcohol use: No   • Drug use: No   • Sexual activity: Not Currently     Partners: Male   Lifestyle   • Physical activity     Days per week: Not on file     Minutes per session: Not on file   • Stress: Not on file   Relationships   • Social connections     Talks on phone: Not on file     Gets together: Not on  file     Attends Protestant service: Not on file     Active member of club or organization: Not on file     Attends meetings of clubs or organizations: Not on file     Relationship status: Not on file   • Intimate partner violence     Fear of current or ex partner: Not on file     Emotionally abused: Not on file     Physically abused: Not on file     Forced sexual activity: Not on file   Other Topics Concern   • Not on file   Social History Narrative   • Not on file       Studies  Lab Results   Component Value Date/Time    CHOLSTRLTOT 206 (H) 09/09/2013 06:57 AM     (H) 09/09/2013 06:57 AM    HDL 46 09/09/2013 06:57 AM    TRIGLYCERIDE 217 (H) 11/18/2013 06:35 AM       Lab Results   Component Value Date/Time    SODIUM 142 10/21/2019 03:18 AM    POTASSIUM 4.2 10/21/2019 03:18 AM    CHLORIDE 105 10/21/2019 03:18 AM    CO2 31 10/21/2019 03:18 AM    GLUCOSE 103 (H) 10/21/2019 03:18 AM    BUN 12 10/21/2019 03:18 AM    CREATININE 0.75 10/21/2019 03:18 AM    CREATININE 0.8 12/10/2008 06:54 AM     Lab Results   Component Value Date/Time    ALKPHOSPHAT 98 09/28/2012 08:30 PM    ASTSGOT 76 (H) 09/28/2012 08:30 PM    ALTSGPT 133 (H) 09/28/2012 08:30 PM    TBILIRUBIN 0.4 09/28/2012 08:30 PM        For this encounter I directly reviewed ECG tracings and medical records I agree with the interpretations in the electronic health record

## 2020-05-01 ENCOUNTER — TELEPHONE (OUTPATIENT)
Dept: CARDIOLOGY | Facility: MEDICAL CENTER | Age: 75
End: 2020-05-01

## 2020-05-01 RX ORDER — VALSARTAN 320 MG/1
320 TABLET ORAL DAILY
Qty: 90 TAB | Refills: 3 | Status: SHIPPED | OUTPATIENT
Start: 2020-05-01 | End: 2020-05-20

## 2020-05-01 RX ORDER — AMLODIPINE BESYLATE 10 MG/1
10 TABLET ORAL DAILY
Qty: 90 TAB | Refills: 3 | Status: SHIPPED | OUTPATIENT
Start: 2020-05-01 | End: 2020-05-20

## 2020-05-01 RX ORDER — HYDROCHLOROTHIAZIDE 25 MG/1
25 TABLET ORAL DAILY
Qty: 90 TAB | Refills: 3 | Status: SHIPPED | OUTPATIENT
Start: 2020-05-01 | End: 2020-05-20

## 2020-05-01 NOTE — TELEPHONE ENCOUNTER
Jose Maria Hess M.D.  You 29 minutes ago (3:28 PM)      Let us prescribe each component separately      Called Rosey and discussed that we would sent Rx for the 3 components of Exforge HCT separately to reduce cost.

## 2020-05-01 NOTE — TELEPHONE ENCOUNTER
BE    Patient daughter, Rosey, called to advise that the medication amLODIPine-Valsartan-HCTZ -25 MG Tab was to expensive, it was advised to call in if that was the case. Please call the Pt back at [phone].      Thank you,  Daxa CARTER

## 2020-05-13 ENCOUNTER — TELEPHONE (OUTPATIENT)
Dept: CARDIOLOGY | Facility: MEDICAL CENTER | Age: 75
End: 2020-05-13

## 2020-05-13 NOTE — TELEPHONE ENCOUNTER
Lab results are reviewed.  Basic metabolic panel looks good.  The lipid profile looks fair but the triglyceride level is elevated.  This is typically related to high carbohydrate intake in the diet.  We will plan to discuss this further at her follow-up visit as well as the blood pressure response antihypertensive therapy.

## 2020-05-20 ENCOUNTER — TELEMEDICINE (OUTPATIENT)
Dept: CARDIOLOGY | Facility: MEDICAL CENTER | Age: 75
End: 2020-05-20

## 2020-05-20 VITALS
BODY MASS INDEX: 37.12 KG/M2 | SYSTOLIC BLOOD PRESSURE: 135 MMHG | WEIGHT: 165 LBS | DIASTOLIC BLOOD PRESSURE: 75 MMHG | HEIGHT: 56 IN

## 2020-05-20 DIAGNOSIS — E78.5 DYSLIPIDEMIA: ICD-10-CM

## 2020-05-20 DIAGNOSIS — R73.03 PREDIABETES: ICD-10-CM

## 2020-05-20 DIAGNOSIS — I10 HYPERTENSION, ESSENTIAL: ICD-10-CM

## 2020-05-20 PROCEDURE — 99214 OFFICE O/P EST MOD 30 MIN: CPT | Mod: 95,CR | Performed by: INTERNAL MEDICINE

## 2020-05-20 RX ORDER — AMLODIPINE BESYLATE VALSARTAN HYDROCHLOROTHIAZIDE 10; 25; 320 MG/1; MG/1; MG/1
1 TABLET, FILM COATED ORAL DAILY
Qty: 90 TAB | Refills: 3 | Status: SHIPPED | OUTPATIENT
Start: 2020-05-20 | End: 2024-02-12

## 2020-05-20 NOTE — PROGRESS NOTES
Cardiology Telemedicine Visit: Established Patient   This encounter was conducted via [employer].   Verbal consent was obtained. Patient's identity was verified.    Assessment and Plan:   PCP: Susi Grant M.D.  The following treatment plan was discussed:     1. Hypertension, essential  Well-controlled with normal laboratories since starting valsartan, amlodipine and HCTZ.  She now believes Exforge HCT would be more affordable than the 3 components and a prescription was given.    2. Dyslipidemia  Elevated triglycerides.  I emphasized the importance of low carbohydrate diet    3. Prediabetes  I informed her of the condition.  I recommended that she follow-up with her primary care to discuss this in more detail.  I again recommended low carbohydrate diet and regular exercise.      Follow up: Return if symptoms worsen or fail to improve.    Subjective:     Chief Complaint   Patient presents with   • Hypertension     History: Desiree Pinto is a 74 y.o. female with a past medical history of hypertension and hyperlipidemia presenting for follow up of hypertension.  When I saw her last month her blood pressure was poorly controlled.  I replaced her medications with Exforge HCT-however she instead preferred to fill the component medication separately.  This has resulted in normalization of her blood pressure with average readings around 135/75.  Now, she believes the prescription would be cheaper as the combination tablet and therefore this was prescribed.  Overall she is feeling better though does not explain exactly what this means.  The triglycerides and fasting glucose were also elevated on the laboratory studies and we discussed these.  She does consume rice and pasta but is amenable restricting these.  She is not having any angina, orthopnea or near syncope.        ROS  Denies any recent fevers or chills. No nausea or vomiting. No chest pains or shortness of breath. All other systems reviewed and negative  "except as per the HPI       Objective:   Vitals obtained by patient:  Respirations through observation: 12  /75 (BP Location: Left arm, Patient Position: Sitting, BP Cuff Size: Adult)   Ht 1.422 m (4' 8\")   Wt 74.8 kg (165 lb)   LMP 02/05/2003   BMI 36.99 kg/m²     Physical Exam:  Constitutional: Alert, no distress, well-groomed.  Skin: No rashes in visible areas.  Eye: Round. Conjunctiva clear, lids normal. No icterus.   ENMT: Lips pink without lesions, good dentition, moist mucous membranes. Phonation normal.  Neck: No masses, no thyromegaly. Moves freely without pain.  CV: Pulse as reported by patient  Respiratory: Unlabored respiratory effort, no cough or audible wheeze  Psych: Alert and oriented x3, normal affect and mood.     Allergies   Allergen Reactions   • Pcn [Penicillins] Rash   • Tylenol [Acetaminophen]      Dizziness      Current medicines (including changes today)  Current Outpatient Medications   Medication Sig Dispense Refill   • amLODIPine-Valsartan-HCTZ -25 MG Tab Take 1 Tab by mouth every day. 90 Tab 3   • levothyroxine (SYNTHROID) 112 MCG Tab Take 112 mcg by mouth Every morning on an empty stomach.       No current facility-administered medications for this visit.      Patient Active Problem List    Diagnosis Date Noted   • Epidural hematoma (HCC) 10/17/2019     Priority: High   • Hypertension 06/16/2009     Priority: High   • IFG (impaired fasting glucose) 11/13/2012     Priority: Low   • Hypokalemia 10/17/2019   • Vitamin d deficiency 10/19/2010   • Hypothyroid 10/19/2010   • Dyslipidemia 06/16/2009     Family History   Problem Relation Age of Onset   • Heart Disease Father      She  has a past medical history of ASTHMA, Breast mass, Dyslipidemia, Heart attack (HCC), HTN, Post-menopausal, and Thyroid disease.  She  has a past surgical history that includes lumbar laminectomy diskectomy (Bilateral, 10/18/2019).  Past Medical History:   Diagnosis Date   • ASTHMA    • Breast mass  "    hx of breast mass   • Dyslipidemia    • Heart attack (HCC)    • HTN    • Post-menopausal    • Thyroid disease      Allergies   Allergen Reactions   • Pcn [Penicillins] Rash   • Tylenol [Acetaminophen]      Dizziness      Outpatient Encounter Medications as of 5/20/2020   Medication Sig Dispense Refill   • amLODIPine-Valsartan-HCTZ -25 MG Tab Take 1 Tab by mouth every day. 90 Tab 3   • levothyroxine (SYNTHROID) 112 MCG Tab Take 112 mcg by mouth Every morning on an empty stomach.     • [DISCONTINUED] amLODIPine (NORVASC) 10 MG Tab Take 1 Tab by mouth every day. 90 Tab 3   • [DISCONTINUED] valsartan (DIOVAN) 320 MG tablet Take 1 Tab by mouth every day. 90 Tab 3   • [DISCONTINUED] hydroCHLOROthiazide (HYDRODIURIL) 25 MG Tab Take 1 Tab by mouth every day. 90 Tab 3     No facility-administered encounter medications on file as of 5/20/2020.      Social History     Socioeconomic History   • Marital status:      Spouse name: Not on file   • Number of children: Not on file   • Years of education: Not on file   • Highest education level: Not on file   Occupational History   • Not on file   Social Needs   • Financial resource strain: Not on file   • Food insecurity     Worry: Not on file     Inability: Not on file   • Transportation needs     Medical: Not on file     Non-medical: Not on file   Tobacco Use   • Smoking status: Never Smoker   • Smokeless tobacco: Never Used   Substance and Sexual Activity   • Alcohol use: No   • Drug use: No   • Sexual activity: Not Currently     Partners: Male   Lifestyle   • Physical activity     Days per week: Not on file     Minutes per session: Not on file   • Stress: Not on file   Relationships   • Social connections     Talks on phone: Not on file     Gets together: Not on file     Attends Faith service: Not on file     Active member of club or organization: Not on file     Attends meetings of clubs or organizations: Not on file     Relationship status: Not on file   •  Intimate partner violence     Fear of current or ex partner: Not on file     Emotionally abused: Not on file     Physically abused: Not on file     Forced sexual activity: Not on file   Other Topics Concern   • Not on file   Social History Narrative   • Not on file       Studies  Lab Results   Component Value Date/Time    CHOLSTRLTOT 206 (H) 09/09/2013 06:57 AM     (H) 09/09/2013 06:57 AM    HDL 46 09/09/2013 06:57 AM    TRIGLYCERIDE 217 (H) 11/18/2013 06:35 AM       Lab Results   Component Value Date/Time    SODIUM 142 10/21/2019 03:18 AM    POTASSIUM 4.2 10/21/2019 03:18 AM    CHLORIDE 105 10/21/2019 03:18 AM    CO2 31 10/21/2019 03:18 AM    GLUCOSE 103 (H) 10/21/2019 03:18 AM    BUN 12 10/21/2019 03:18 AM    CREATININE 0.75 10/21/2019 03:18 AM    CREATININE 0.8 12/10/2008 06:54 AM     Lab Results   Component Value Date/Time    ALKPHOSPHAT 98 09/28/2012 08:30 PM    ASTSGOT 76 (H) 09/28/2012 08:30 PM    ALTSGPT 133 (H) 09/28/2012 08:30 PM    TBILIRUBIN 0.4 09/28/2012 08:30 PM        For this encounter I directly reviewed medical records I agree with the interpretations in the electronic health record

## 2021-01-15 DIAGNOSIS — Z23 NEED FOR VACCINATION: ICD-10-CM

## 2022-08-31 ENCOUNTER — HOSPITAL ENCOUNTER (EMERGENCY)
Facility: MEDICAL CENTER | Age: 77
End: 2022-08-31
Payer: COMMERCIAL

## 2024-02-12 ENCOUNTER — APPOINTMENT (OUTPATIENT)
Dept: RADIOLOGY | Facility: MEDICAL CENTER | Age: 79
DRG: 189 | End: 2024-02-12
Attending: EMERGENCY MEDICINE

## 2024-02-12 ENCOUNTER — HOSPITAL ENCOUNTER (INPATIENT)
Facility: MEDICAL CENTER | Age: 79
LOS: 1 days | DRG: 189 | End: 2024-02-13
Attending: EMERGENCY MEDICINE | Admitting: STUDENT IN AN ORGANIZED HEALTH CARE EDUCATION/TRAINING PROGRAM

## 2024-02-12 ENCOUNTER — APPOINTMENT (OUTPATIENT)
Dept: RADIOLOGY | Facility: MEDICAL CENTER | Age: 79
DRG: 189 | End: 2024-02-12

## 2024-02-12 DIAGNOSIS — I10 HYPERTENSION, UNSPECIFIED TYPE: ICD-10-CM

## 2024-02-12 DIAGNOSIS — R05.1 ACUTE COUGH: ICD-10-CM

## 2024-02-12 DIAGNOSIS — J18.9 ATYPICAL PNEUMONIA: ICD-10-CM

## 2024-02-12 DIAGNOSIS — J96.01 ACUTE RESPIRATORY FAILURE WITH HYPOXIA (HCC): ICD-10-CM

## 2024-02-12 DIAGNOSIS — R06.02 SHORTNESS OF BREATH: ICD-10-CM

## 2024-02-12 PROBLEM — I16.0 HYPERTENSIVE URGENCY: Status: ACTIVE | Noted: 2024-02-12

## 2024-02-12 LAB
ALBUMIN SERPL BCP-MCNC: 4.6 G/DL (ref 3.2–4.9)
ALBUMIN/GLOB SERPL: 1.2 G/DL
ALP SERPL-CCNC: 103 U/L (ref 30–99)
ALT SERPL-CCNC: 17 U/L (ref 2–50)
ANION GAP SERPL CALC-SCNC: 15 MMOL/L (ref 7–16)
APPEARANCE UR: CLEAR
AST SERPL-CCNC: 24 U/L (ref 12–45)
BACTERIA #/AREA URNS HPF: NEGATIVE /HPF
BASOPHILS # BLD AUTO: 0.5 % (ref 0–1.8)
BASOPHILS # BLD: 0.06 K/UL (ref 0–0.12)
BILIRUB SERPL-MCNC: 0.4 MG/DL (ref 0.1–1.5)
BILIRUB UR QL STRIP.AUTO: NEGATIVE
BUN SERPL-MCNC: 13 MG/DL (ref 8–22)
CALCIUM ALBUM COR SERPL-MCNC: 8.9 MG/DL (ref 8.5–10.5)
CALCIUM SERPL-MCNC: 9.4 MG/DL (ref 8.5–10.5)
CHLORIDE SERPL-SCNC: 99 MMOL/L (ref 96–112)
CHOLEST SERPL-MCNC: 202 MG/DL (ref 100–199)
CO2 SERPL-SCNC: 25 MMOL/L (ref 20–33)
COLOR UR: YELLOW
CREAT SERPL-MCNC: 0.76 MG/DL (ref 0.5–1.4)
EKG IMPRESSION: NORMAL
EOSINOPHIL # BLD AUTO: 0.26 K/UL (ref 0–0.51)
EOSINOPHIL NFR BLD: 2.3 % (ref 0–6.9)
EPI CELLS #/AREA URNS HPF: NEGATIVE /HPF
ERYTHROCYTE [DISTWIDTH] IN BLOOD BY AUTOMATED COUNT: 45.1 FL (ref 35.9–50)
FLUAV RNA SPEC QL NAA+PROBE: NEGATIVE
FLUBV RNA SPEC QL NAA+PROBE: NEGATIVE
GFR SERPLBLD CREATININE-BSD FMLA CKD-EPI: 81 ML/MIN/1.73 M 2
GLOBULIN SER CALC-MCNC: 3.7 G/DL (ref 1.9–3.5)
GLUCOSE SERPL-MCNC: 108 MG/DL (ref 65–99)
GLUCOSE UR STRIP.AUTO-MCNC: NEGATIVE MG/DL
HCT VFR BLD AUTO: 43.9 % (ref 37–47)
HDLC SERPL-MCNC: 59 MG/DL
HGB BLD-MCNC: 15.6 G/DL (ref 12–16)
HYALINE CASTS #/AREA URNS LPF: ABNORMAL /LPF
IMM GRANULOCYTES # BLD AUTO: 0.06 K/UL (ref 0–0.11)
IMM GRANULOCYTES NFR BLD AUTO: 0.5 % (ref 0–0.9)
KETONES UR STRIP.AUTO-MCNC: NEGATIVE MG/DL
LACTATE SERPL-SCNC: 1.1 MMOL/L (ref 0.5–2)
LACTATE SERPL-SCNC: 1.4 MMOL/L (ref 0.5–2)
LDLC SERPL CALC-MCNC: 109 MG/DL
LEUKOCYTE ESTERASE UR QL STRIP.AUTO: NEGATIVE
LYMPHOCYTES # BLD AUTO: 0.94 K/UL (ref 1–4.8)
LYMPHOCYTES NFR BLD: 8.3 % (ref 22–41)
MCH RBC QN AUTO: 30.4 PG (ref 27–33)
MCHC RBC AUTO-ENTMCNC: 35.5 G/DL (ref 32.2–35.5)
MCV RBC AUTO: 85.6 FL (ref 81.4–97.8)
MICRO URNS: ABNORMAL
MONOCYTES # BLD AUTO: 0.42 K/UL (ref 0–0.85)
MONOCYTES NFR BLD AUTO: 3.7 % (ref 0–13.4)
NEUTROPHILS # BLD AUTO: 9.54 K/UL (ref 1.82–7.42)
NEUTROPHILS NFR BLD: 84.7 % (ref 44–72)
NITRITE UR QL STRIP.AUTO: NEGATIVE
NRBC # BLD AUTO: 0 K/UL
NRBC BLD-RTO: 0 /100 WBC (ref 0–0.2)
PH UR STRIP.AUTO: 6.5 [PH] (ref 5–8)
PLATELET # BLD AUTO: 227 K/UL (ref 164–446)
PMV BLD AUTO: 10.7 FL (ref 9–12.9)
POTASSIUM SERPL-SCNC: 3.2 MMOL/L (ref 3.6–5.5)
PROCALCITONIN SERPL-MCNC: 0.05 NG/ML
PROT SERPL-MCNC: 8.3 G/DL (ref 6–8.2)
PROT UR QL STRIP: 30 MG/DL
RBC # BLD AUTO: 5.13 M/UL (ref 4.2–5.4)
RBC # URNS HPF: ABNORMAL /HPF
RBC UR QL AUTO: NEGATIVE
RSV RNA SPEC QL NAA+PROBE: NEGATIVE
SARS-COV-2 RNA RESP QL NAA+PROBE: NOTDETECTED
SODIUM SERPL-SCNC: 139 MMOL/L (ref 135–145)
SP GR UR STRIP.AUTO: 1.02
SPECIMEN SOURCE: NORMAL
TRIGL SERPL-MCNC: 170 MG/DL (ref 0–149)
UROBILINOGEN UR STRIP.AUTO-MCNC: 0.2 MG/DL
WBC # BLD AUTO: 11.3 K/UL (ref 4.8–10.8)
WBC #/AREA URNS HPF: ABNORMAL /HPF

## 2024-02-12 PROCEDURE — 96365 THER/PROPH/DIAG IV INF INIT: CPT

## 2024-02-12 PROCEDURE — 93005 ELECTROCARDIOGRAM TRACING: CPT

## 2024-02-12 PROCEDURE — 81001 URINALYSIS AUTO W/SCOPE: CPT

## 2024-02-12 PROCEDURE — 80061 LIPID PANEL: CPT

## 2024-02-12 PROCEDURE — 71275 CT ANGIOGRAPHY CHEST: CPT

## 2024-02-12 PROCEDURE — 80053 COMPREHEN METABOLIC PANEL: CPT

## 2024-02-12 PROCEDURE — 700101 HCHG RX REV CODE 250: Performed by: STUDENT IN AN ORGANIZED HEALTH CARE EDUCATION/TRAINING PROGRAM

## 2024-02-12 PROCEDURE — 96375 TX/PRO/DX INJ NEW DRUG ADDON: CPT

## 2024-02-12 PROCEDURE — 36415 COLL VENOUS BLD VENIPUNCTURE: CPT

## 2024-02-12 PROCEDURE — 700102 HCHG RX REV CODE 250 W/ 637 OVERRIDE(OP): Mod: JZ | Performed by: STUDENT IN AN ORGANIZED HEALTH CARE EDUCATION/TRAINING PROGRAM

## 2024-02-12 PROCEDURE — 83605 ASSAY OF LACTIC ACID: CPT

## 2024-02-12 PROCEDURE — 99223 1ST HOSP IP/OBS HIGH 75: CPT | Performed by: STUDENT IN AN ORGANIZED HEALTH CARE EDUCATION/TRAINING PROGRAM

## 2024-02-12 PROCEDURE — 71045 X-RAY EXAM CHEST 1 VIEW: CPT

## 2024-02-12 PROCEDURE — 94640 AIRWAY INHALATION TREATMENT: CPT

## 2024-02-12 PROCEDURE — 87086 URINE CULTURE/COLONY COUNT: CPT

## 2024-02-12 PROCEDURE — A9270 NON-COVERED ITEM OR SERVICE: HCPCS | Mod: JZ | Performed by: STUDENT IN AN ORGANIZED HEALTH CARE EDUCATION/TRAINING PROGRAM

## 2024-02-12 PROCEDURE — 770006 HCHG ROOM/CARE - MED/SURG/GYN SEMI*

## 2024-02-12 PROCEDURE — 700111 HCHG RX REV CODE 636 W/ 250 OVERRIDE (IP): Mod: JZ | Performed by: EMERGENCY MEDICINE

## 2024-02-12 PROCEDURE — 87040 BLOOD CULTURE FOR BACTERIA: CPT | Mod: 91

## 2024-02-12 PROCEDURE — 93005 ELECTROCARDIOGRAM TRACING: CPT | Performed by: EMERGENCY MEDICINE

## 2024-02-12 PROCEDURE — 85025 COMPLETE CBC W/AUTO DIFF WBC: CPT

## 2024-02-12 PROCEDURE — 0241U HCHG SARS-COV-2 COVID-19 NFCT DS RESP RNA 4 TRGT MIC: CPT

## 2024-02-12 PROCEDURE — 700111 HCHG RX REV CODE 636 W/ 250 OVERRIDE (IP): Performed by: STUDENT IN AN ORGANIZED HEALTH CARE EDUCATION/TRAINING PROGRAM

## 2024-02-12 PROCEDURE — 84145 PROCALCITONIN (PCT): CPT

## 2024-02-12 PROCEDURE — 99285 EMERGENCY DEPT VISIT HI MDM: CPT

## 2024-02-12 PROCEDURE — 700117 HCHG RX CONTRAST REV CODE 255: Performed by: EMERGENCY MEDICINE

## 2024-02-12 RX ORDER — METHYLPREDNISOLONE SODIUM SUCCINATE 125 MG/2ML
125 INJECTION, POWDER, LYOPHILIZED, FOR SOLUTION INTRAMUSCULAR; INTRAVENOUS ONCE
Status: COMPLETED | OUTPATIENT
Start: 2024-02-12 | End: 2024-02-12

## 2024-02-12 RX ORDER — AMLODIPINE BESYLATE 5 MG/1
10 TABLET ORAL ONCE
Status: COMPLETED | OUTPATIENT
Start: 2024-02-12 | End: 2024-02-12

## 2024-02-12 RX ORDER — DOXYCYCLINE HYCLATE 100 MG
100 TABLET ORAL 2 TIMES DAILY
Status: ON HOLD | COMMUNITY
Start: 2024-02-12 | End: 2024-02-13

## 2024-02-12 RX ORDER — ALBUTEROL SULFATE 90 UG/1
2 AEROSOL, METERED RESPIRATORY (INHALATION)
Status: DISCONTINUED | OUTPATIENT
Start: 2024-02-12 | End: 2024-02-12

## 2024-02-12 RX ORDER — IPRATROPIUM BROMIDE AND ALBUTEROL SULFATE 2.5; .5 MG/3ML; MG/3ML
3 SOLUTION RESPIRATORY (INHALATION)
Status: DISCONTINUED | OUTPATIENT
Start: 2024-02-12 | End: 2024-02-12

## 2024-02-12 RX ORDER — LEVOTHYROXINE SODIUM 112 UG/1
112 TABLET ORAL
Status: DISCONTINUED | OUTPATIENT
Start: 2024-02-12 | End: 2024-02-13 | Stop reason: HOSPADM

## 2024-02-12 RX ORDER — DOXYCYCLINE 100 MG/1
100 CAPSULE ORAL 2 TIMES DAILY
Qty: 20 CAPSULE | Refills: 0 | Status: ACTIVE | OUTPATIENT
Start: 2024-02-12

## 2024-02-12 RX ORDER — POTASSIUM CHLORIDE 7.45 MG/ML
10 INJECTION INTRAVENOUS
Status: DISPENSED | OUTPATIENT
Start: 2024-02-12 | End: 2024-02-12

## 2024-02-12 RX ORDER — IBUPROFEN 200 MG
200 TABLET ORAL 2 TIMES DAILY PRN
Status: ON HOLD | COMMUNITY
End: 2024-02-13

## 2024-02-12 RX ORDER — HYDRALAZINE HYDROCHLORIDE 20 MG/ML
10 INJECTION INTRAMUSCULAR; INTRAVENOUS ONCE
Status: COMPLETED | OUTPATIENT
Start: 2024-02-12 | End: 2024-02-12

## 2024-02-12 RX ORDER — AMLODIPINE BESYLATE VALSARTAN HYDROCHLOROTHIAZIDE 10; 25; 320 MG/1; MG/1; MG/1
1 TABLET, FILM COATED ORAL DAILY
Status: DISCONTINUED | OUTPATIENT
Start: 2024-02-12 | End: 2024-02-12

## 2024-02-12 RX ORDER — CODEINE PHOSPHATE AND GUAIFENESIN 10; 100 MG/5ML; MG/5ML
5 SOLUTION ORAL EVERY 4 HOURS PRN
Qty: 120 ML | Refills: 0 | Status: SHIPPED | OUTPATIENT
Start: 2024-02-12 | End: 2024-02-17

## 2024-02-12 RX ORDER — IPRATROPIUM BROMIDE AND ALBUTEROL SULFATE 2.5; .5 MG/3ML; MG/3ML
3 SOLUTION RESPIRATORY (INHALATION)
Status: DISCONTINUED | OUTPATIENT
Start: 2024-02-12 | End: 2024-02-13 | Stop reason: HOSPADM

## 2024-02-12 RX ORDER — AMLODIPINE BESYLATE 5 MG/1
5 TABLET ORAL
Status: DISCONTINUED | OUTPATIENT
Start: 2024-02-13 | End: 2024-02-13

## 2024-02-12 RX ORDER — CODEINE PHOSPHATE/GUAIFENESIN 10-100MG/5
5 LIQUID (ML) ORAL EVERY 4 HOURS PRN
Status: ON HOLD | COMMUNITY
End: 2024-02-13

## 2024-02-12 RX ORDER — HYDRALAZINE HYDROCHLORIDE 20 MG/ML
10 INJECTION INTRAMUSCULAR; INTRAVENOUS EVERY 6 HOURS PRN
Status: DISCONTINUED | OUTPATIENT
Start: 2024-02-12 | End: 2024-02-13 | Stop reason: HOSPADM

## 2024-02-12 RX ORDER — ENOXAPARIN SODIUM 100 MG/ML
40 INJECTION SUBCUTANEOUS DAILY
Status: DISCONTINUED | OUTPATIENT
Start: 2024-02-12 | End: 2024-02-13 | Stop reason: HOSPADM

## 2024-02-12 RX ORDER — POTASSIUM CHLORIDE 20 MEQ/1
40 TABLET, EXTENDED RELEASE ORAL ONCE
Status: COMPLETED | OUTPATIENT
Start: 2024-02-12 | End: 2024-02-12

## 2024-02-12 RX ADMIN — HYDRALAZINE HYDROCHLORIDE 10 MG: 20 INJECTION, SOLUTION INTRAMUSCULAR; INTRAVENOUS at 17:47

## 2024-02-12 RX ADMIN — IPRATROPIUM BROMIDE AND ALBUTEROL SULFATE 3 ML: 2.5; .5 SOLUTION RESPIRATORY (INHALATION) at 17:50

## 2024-02-12 RX ADMIN — POTASSIUM CHLORIDE 10 MEQ: 7.46 INJECTION, SOLUTION INTRAVENOUS at 22:35

## 2024-02-12 RX ADMIN — METHYLPREDNISOLONE SODIUM SUCCINATE 125 MG: 125 INJECTION, POWDER, FOR SOLUTION INTRAMUSCULAR; INTRAVENOUS at 17:04

## 2024-02-12 RX ADMIN — IOHEXOL 40 ML: 350 INJECTION, SOLUTION INTRAVENOUS at 17:28

## 2024-02-12 RX ADMIN — POTASSIUM CHLORIDE 40 MEQ: 1500 TABLET, EXTENDED RELEASE ORAL at 18:29

## 2024-02-12 RX ADMIN — POTASSIUM CHLORIDE 10 MEQ: 7.46 INJECTION, SOLUTION INTRAVENOUS at 18:33

## 2024-02-12 RX ADMIN — AMLODIPINE BESYLATE 10 MG: 5 TABLET ORAL at 18:55

## 2024-02-12 ASSESSMENT — COGNITIVE AND FUNCTIONAL STATUS - GENERAL
CLIMB 3 TO 5 STEPS WITH RAILING: A LITTLE
SUGGESTED CMS G CODE MODIFIER DAILY ACTIVITY: CJ
SUGGESTED CMS G CODE MODIFIER MOBILITY: CK
TOILETING: A LITTLE
DAILY ACTIVITIY SCORE: 21
WALKING IN HOSPITAL ROOM: A LITTLE
STANDING UP FROM CHAIR USING ARMS: A LITTLE
DRESSING REGULAR UPPER BODY CLOTHING: A LITTLE
MOBILITY SCORE: 19
MOVING TO AND FROM BED TO CHAIR: A LITTLE
MOVING FROM LYING ON BACK TO SITTING ON SIDE OF FLAT BED: A LITTLE
DRESSING REGULAR LOWER BODY CLOTHING: A LITTLE

## 2024-02-12 ASSESSMENT — ENCOUNTER SYMPTOMS
TINGLING: 0
PALPITATIONS: 0
VOMITING: 0
EYE PAIN: 0
HEMOPTYSIS: 0
NERVOUS/ANXIOUS: 0
HALLUCINATIONS: 0
ORTHOPNEA: 0
BACK PAIN: 1
WHEEZING: 1
DIARRHEA: 0
TREMORS: 0
SINUS PAIN: 0
PHOTOPHOBIA: 0
SHORTNESS OF BREATH: 1
ABDOMINAL PAIN: 0
EYE DISCHARGE: 0
SPUTUM PRODUCTION: 0
NAUSEA: 0
DIZZINESS: 0
NECK PAIN: 0
COUGH: 1
HEADACHES: 0
CHILLS: 0
FEVER: 0

## 2024-02-12 ASSESSMENT — LIFESTYLE VARIABLES
TOTAL SCORE: 0
TOTAL SCORE: 0
DOES PATIENT WANT TO STOP DRINKING: NO
HAVE YOU EVER FELT YOU SHOULD CUT DOWN ON YOUR DRINKING: NO
CONSUMPTION TOTAL: NEGATIVE
HOW MANY TIMES IN THE PAST YEAR HAVE YOU HAD 5 OR MORE DRINKS IN A DAY: 0
EVER HAD A DRINK FIRST THING IN THE MORNING TO STEADY YOUR NERVES TO GET RID OF A HANGOVER: NO
HAVE PEOPLE ANNOYED YOU BY CRITICIZING YOUR DRINKING: NO
EVER_SMOKED: NEVER
EVER FELT BAD OR GUILTY ABOUT YOUR DRINKING: NO
SUBSTANCE_ABUSE: 0
TOTAL SCORE: 0
AVERAGE NUMBER OF DAYS PER WEEK YOU HAVE A DRINK CONTAINING ALCOHOL: 0
ALCOHOL_USE: NO
ON A TYPICAL DAY WHEN YOU DRINK ALCOHOL HOW MANY DRINKS DO YOU HAVE: 0

## 2024-02-12 ASSESSMENT — COPD QUESTIONNAIRES
DURING THE PAST 4 WEEKS HOW MUCH DID YOU FEEL SHORT OF BREATH: NONE/LITTLE OF THE TIME
DO YOU EVER COUGH UP ANY MUCUS OR PHLEGM?: NO/ONLY WITH OCCASIONAL COLDS OR INFECTIONS
COPD SCREENING SCORE: 2
HAVE YOU SMOKED AT LEAST 100 CIGARETTES IN YOUR ENTIRE LIFE: NO/DON'T KNOW

## 2024-02-12 ASSESSMENT — PAIN DESCRIPTION - PAIN TYPE: TYPE: ACUTE PAIN

## 2024-02-12 ASSESSMENT — FIBROSIS 4 INDEX: FIB4 SCORE: 1.95

## 2024-02-12 NOTE — ED TRIAGE NOTES
"Chief Complaint   Patient presents with    Shortness of Breath    Cough     Family reports cough and chest congestion x3 weeks    Dizziness     BP (!) 189/123   Pulse (!) 101   Temp 36.4 °C (97.5 °F) (Temporal)   Resp (!) 22   Ht 1.422 m (4' 8\")   Wt 70.5 kg (155 lb 6.8 oz)   LMP 02/05/2003   SpO2 95%   BMI 34.85 kg/m²     Pt ambulated into triage. EKG ordered. Educated on triage process. Instructed to notify staff for any worsening symptoms.  "

## 2024-02-12 NOTE — ED PROVIDER NOTES
ER Provider Note    Scribed for Jong William D.O. by Abbie Tomlin. 2/12/2024  2:32 PM    Primary Care Provider: Susi Grant M.D.    CHIEF COMPLAINT  Chief Complaint   Patient presents with    Shortness of Breath    Cough     Family reports cough and chest congestion x3 weeks    Dizziness       HPI/ROS    OUTSIDE HISTORIAN(S):  Family at bedside to confirm sequence of events and collateral information provided.     Desiree Pinto is a 78 y.o. female who presents to the Emergency Department for evaluation of cough and chest congestion onset 3 weeks ago. The patient states she also has chest pain, fevers, and chills, but denies any nausea, vomiting, diarrhea. The patient's chest pain and shortness of breath are brought on with the cough. The patient's family reports that the patient stated that she had a fever and chills last night. The patient denies the use of at home oxygen. The patient's family denies the patient having any history of respiratory complications.     ROS as per HPI.    PAST MEDICAL HISTORY  Past Medical History:   Diagnosis Date    ASTHMA     Breast mass     hx of breast mass    Dyslipidemia     Heart attack (HCC)     HTN     Post-menopausal     Thyroid disease        SURGICAL HISTORY  Past Surgical History:   Procedure Laterality Date    LUMBAR LAMINECTOMY DISKECTOMY Bilateral 10/18/2019    Procedure: LAMINECTOMY, SPINE, LUMBAR, WITH DISCECTOMY- L2-5;  Surgeon: Jose Cruz M.D.;  Location: SURGERY [facility];  Service: Neurosurgery       FAMILY HISTORY  Family History   Problem Relation Age of Onset    Heart Disease Father        SOCIAL HISTORY   reports that she has never smoked. She has never used smokeless tobacco. She reports that she does not drink alcohol and does not use drugs.      CURRENT MEDICATIONS  Previous Medications    DOXYCYCLINE (VIBRAMYCIN) 100 MG TAB    Take 100 mg by mouth 2 times a day. X 10 days    GUAIFENESIN-CODEINE  "(TUSSI-ORGANIDIN NR) 100-10 MG/5ML SYRUP    Take 5 mL by mouth every four hours as needed for Cough.    IBUPROFEN (MOTRIN) 200 MG TAB    Take 200 mg by mouth 2 times a day as needed for Mild Pain.       ALLERGIES  Pcn [penicillins] and Tylenol [acetaminophen]      PHYSICAL EXAM  BP (!) 173/84   Pulse 100   Temp 36.4 °C (97.5 °F) (Temporal)   Resp 19   Ht 1.422 m (4' 8\")   Wt 70.5 kg (155 lb 6.8 oz)   LMP 02/05/2003   SpO2 91%   BMI 34.85 kg/m²     General: No acute distress.  HENT: Normocephalic, Mucus membranes are moist.   Chest: Lungs have even and unlabored respirations, Clear to auscultation.   Cardiovascular: Regular rate and regular rhythm, No peripheral cyanosis.  Abdomen: Non distended.  Neuro: Awake, Conversive, Able to relay recent events.  Psychiatric: Calm and cooperative.     EXTERNAL RECORDS REVIEWED  Review of patient's past medical records show the patient has home visiting physical therapy.     INITIAL ASSESSMENT  Patient presents with cough. Her chest pain is only with a cough. No cough or shortness of breath with exertion. Concerning for pneumonia, asthma, bronchitis and viral illness. X-ray will be done. Viral swabs will be done. And lab tests will be done to evaluate for other infections.     ED Observation Status? No; Patient does not meet criteria for ED Observation.     DIAGNOSTIC STUDIES    Labs:   Results for orders placed or performed during the hospital encounter of 02/12/24   Lactic Acid   Result Value Ref Range    Lactic Acid 1.1 0.5 - 2.0 mmol/L   CBC with Differential   Result Value Ref Range    WBC 11.3 (H) 4.8 - 10.8 K/uL    RBC 5.13 4.20 - 5.40 M/uL    Hemoglobin 15.6 12.0 - 16.0 g/dL    Hematocrit 43.9 37.0 - 47.0 %    MCV 85.6 81.4 - 97.8 fL    MCH 30.4 27.0 - 33.0 pg    MCHC 35.5 32.2 - 35.5 g/dL    RDW 45.1 35.9 - 50.0 fL    Platelet Count 227 164 - 446 K/uL    MPV 10.7 9.0 - 12.9 fL    Neutrophils-Polys 84.70 (H) 44.00 - 72.00 %    Lymphocytes 8.30 (L) 22.00 - 41.00 % "    Monocytes 3.70 0.00 - 13.40 %    Eosinophils 2.30 0.00 - 6.90 %    Basophils 0.50 0.00 - 1.80 %    Immature Granulocytes 0.50 0.00 - 0.90 %    Nucleated RBC 0.00 0.00 - 0.20 /100 WBC    Neutrophils (Absolute) 9.54 (H) 1.82 - 7.42 K/uL    Lymphs (Absolute) 0.94 (L) 1.00 - 4.80 K/uL    Monos (Absolute) 0.42 0.00 - 0.85 K/uL    Eos (Absolute) 0.26 0.00 - 0.51 K/uL    Baso (Absolute) 0.06 0.00 - 0.12 K/uL    Immature Granulocytes (abs) 0.06 0.00 - 0.11 K/uL    NRBC (Absolute) 0.00 K/uL   Complete Metabolic Panel   Result Value Ref Range    Sodium 139 135 - 145 mmol/L    Potassium 3.2 (L) 3.6 - 5.5 mmol/L    Chloride 99 96 - 112 mmol/L    Co2 25 20 - 33 mmol/L    Anion Gap 15.0 7.0 - 16.0    Glucose 108 (H) 65 - 99 mg/dL    Bun 13 8 - 22 mg/dL    Creatinine 0.76 0.50 - 1.40 mg/dL    Calcium 9.4 8.5 - 10.5 mg/dL    Correct Calcium 8.9 8.5 - 10.5 mg/dL    AST(SGOT) 24 12 - 45 U/L    ALT(SGPT) 17 2 - 50 U/L    Alkaline Phosphatase 103 (H) 30 - 99 U/L    Total Bilirubin 0.4 0.1 - 1.5 mg/dL    Albumin 4.6 3.2 - 4.9 g/dL    Total Protein 8.3 (H) 6.0 - 8.2 g/dL    Globulin 3.7 (H) 1.9 - 3.5 g/dL    A-G Ratio 1.2 g/dL   Urinalysis    Specimen: Urine   Result Value Ref Range    Micro Urine Req Microscopic    CoV-2, FLU A/B, and RSV by PCR (2-4 Hours CEPHEID) : Collect NP swab in VTM    Specimen: Respirate   Result Value Ref Range    Influenza virus A RNA Negative Negative    Influenza virus B, PCR Negative Negative    RSV, PCR Negative Negative    SARS-CoV-2 by PCR NotDetected     SARS-CoV-2 Source NP Swab    ESTIMATED GFR   Result Value Ref Range    GFR (CKD-EPI) 81 >60 mL/min/1.73 m 2   PROCALCITONIN   Result Value Ref Range    Procalcitonin 0.05 <0.25 ng/mL   Lipid Profile   Result Value Ref Range    Cholesterol,Tot 202 (H) 100 - 199 mg/dL    Triglycerides 170 (H) 0 - 149 mg/dL    HDL 59 >=40 mg/dL     (H) <100 mg/dL   EKG   Result Value Ref Range    Report       [facility] Emergency Dept.    Test  Date:  2024  Pt Name:    NASIM COONEY     Department: ER  MRN:        [id number]                      Room:        27  Gender:     Female                       Technician: [id number]  :        [id number]                   Requested By:ER TRIAGE PROTOCOL  Order #:    [id number]                    Reading MD: CONCEPCION SWENSON D.O.    Measurements  Intervals                                Axis  Rate:       101                          P:          55  MI:         183                          QRS:        -34  QRSD:       99                           T:          84  QT:         370  QTc:        480    Interpretive Statements  Sinus tachycardia  Left axis deviation  Borderline T abnormalities, lateral leads  Compared to ECG 10/18/2019 08:15:30  Left-axis deviation now present  T-wave abnormality now present  Sinus rhythm no longer present  Electronically Signed On 2024 16:15:28 PST by CONCEPCION SWENSON D.O.              EKG:   I have independently interpreted the above EKG.        Radiology:   The attending emergency physician has independently interpreted the diagnostic imaging associated with this visit and am waiting the final reading from the radiologist.   Preliminary interpretation is as follows: No pneumonia  Radiologist interpretation:   CT-CTA CHEST PULMONARY ARTERY W/ RECONS   Final Result      1.  Negative for pulmonary embolism      2.  Minimal dependent atelectasis      3.  Aortic atherosclerotic plaque            DX-CHEST-PORTABLE (1 VIEW)   Final Result      No acute cardiopulmonary disease evident.           COURSE & MEDICAL DECISION MAKING     COURSE AND PLAN  2:32 PM - Patient seen and examined at bedside. This is a 78 year old woman who presents with a cough onset 3 weeks ago. The patient has chest pain and shortness of breath with the cough. Discussed plan of care, including performing an EKG, lab work and imaging. Patient agrees to the plan of care. Ordered for an EKG, Blood  Culture x2, Urine Culture, UA, CMP, CBC w/ Diff., Lactic Acid, CoV-2 Flu A/B and RSV PCR, and DX-Chest to evaluate her symptoms.     4:32 PM - The patient was reevaluated at bedside. Pulse oximetry is 86-87 on room air. The patient will require hospitalization.      4:40 PM - I discussed the patient's case and the above findings with Dr. Lucas (Hospitalist) who will hospitalize the patient for further care.      ED Summary: Patient presents with a cough for 3 weeks.  Her viral swabs are negative chest x-ray shows no pneumonia.  Is CBC and chemistry shows no concerns for sepsis.    Plan was Made to discharge the patient however her pulse oximetry remained low, she responded well to supplemental oxygen the patient will be admitted for further evaluation and treatment.      DISPOSITION AND DISCUSSIONS  I have discussed management of the patient with the following physicians and ANGY's: Dr. Lucas (Hospitalist)     Discussion of management with other QHP or appropriate source(s): None.    Barriers to care at this time, including but not limited to: None known.     DISPOSITION:  Patient will be hospitalized by Dr. Lucas in guarded condition.     FINAL DIAGNOSIS  1. Acute cough    2. Shortness of breath    3. Atypical pneumonia    4. Acute respiratory failure with hypoxia (HCC)      IAbbie (Scribe), am scribing for, and in the presence of, Jong William D.O..    Electronically signed by: Abbie Tomlin ([place]), 2/12/2024    IJong D.O. personally performed the services described in this documentation, as scribed by Abbie Tomlin in my presence, and it is both accurate and complete.     The note accurately reflects work and decisions made by me.  Jong William D.O.  2/12/2024  7:36 PM

## 2024-02-13 VITALS
BODY MASS INDEX: 34.96 KG/M2 | WEIGHT: 155.42 LBS | HEART RATE: 84 BPM | OXYGEN SATURATION: 91 % | SYSTOLIC BLOOD PRESSURE: 159 MMHG | RESPIRATION RATE: 16 BRPM | DIASTOLIC BLOOD PRESSURE: 96 MMHG | TEMPERATURE: 97.7 F | HEIGHT: 56 IN

## 2024-02-13 LAB
ALBUMIN SERPL BCP-MCNC: 4.2 G/DL (ref 3.2–4.9)
BUN SERPL-MCNC: 20 MG/DL (ref 8–22)
CALCIUM ALBUM COR SERPL-MCNC: 9.1 MG/DL (ref 8.5–10.5)
CALCIUM SERPL-MCNC: 9.3 MG/DL (ref 8.5–10.5)
CHLORIDE SERPL-SCNC: 106 MMOL/L (ref 96–112)
CO2 SERPL-SCNC: 26 MMOL/L (ref 20–33)
CREAT SERPL-MCNC: 0.65 MG/DL (ref 0.5–1.4)
ERYTHROCYTE [DISTWIDTH] IN BLOOD BY AUTOMATED COUNT: 47.6 FL (ref 35.9–50)
GFR SERPLBLD CREATININE-BSD FMLA CKD-EPI: 91 ML/MIN/1.73 M 2
GLUCOSE SERPL-MCNC: 133 MG/DL (ref 65–99)
HCT VFR BLD AUTO: 42.5 % (ref 37–47)
HGB BLD-MCNC: 14.3 G/DL (ref 12–16)
MCH RBC QN AUTO: 29.5 PG (ref 27–33)
MCHC RBC AUTO-ENTMCNC: 33.6 G/DL (ref 32.2–35.5)
MCV RBC AUTO: 87.6 FL (ref 81.4–97.8)
PHOSPHATE SERPL-MCNC: 2.7 MG/DL (ref 2.5–4.5)
PLATELET # BLD AUTO: 223 K/UL (ref 164–446)
PMV BLD AUTO: 11.3 FL (ref 9–12.9)
POTASSIUM SERPL-SCNC: 4.3 MMOL/L (ref 3.6–5.5)
RBC # BLD AUTO: 4.85 M/UL (ref 4.2–5.4)
SODIUM SERPL-SCNC: 141 MMOL/L (ref 135–145)
WBC # BLD AUTO: 11.5 K/UL (ref 4.8–10.8)

## 2024-02-13 PROCEDURE — 700102 HCHG RX REV CODE 250 W/ 637 OVERRIDE(OP): Performed by: STUDENT IN AN ORGANIZED HEALTH CARE EDUCATION/TRAINING PROGRAM

## 2024-02-13 PROCEDURE — 80069 RENAL FUNCTION PANEL: CPT

## 2024-02-13 PROCEDURE — A9270 NON-COVERED ITEM OR SERVICE: HCPCS | Performed by: STUDENT IN AN ORGANIZED HEALTH CARE EDUCATION/TRAINING PROGRAM

## 2024-02-13 PROCEDURE — 99239 HOSP IP/OBS DSCHRG MGMT >30: CPT | Performed by: HOSPITALIST

## 2024-02-13 PROCEDURE — 85027 COMPLETE CBC AUTOMATED: CPT

## 2024-02-13 RX ORDER — BENZONATATE 100 MG/1
100 CAPSULE ORAL 3 TIMES DAILY PRN
Qty: 30 CAPSULE | Refills: 0 | Status: SHIPPED | OUTPATIENT
Start: 2024-02-13

## 2024-02-13 RX ORDER — AMLODIPINE BESYLATE 10 MG/1
10 TABLET ORAL DAILY
Qty: 30 TABLET | Refills: 2 | Status: SHIPPED | OUTPATIENT
Start: 2024-02-14

## 2024-02-13 RX ORDER — AMLODIPINE BESYLATE 10 MG/1
10 TABLET ORAL
Status: DISCONTINUED | OUTPATIENT
Start: 2024-02-14 | End: 2024-02-13 | Stop reason: HOSPADM

## 2024-02-13 RX ADMIN — AMLODIPINE BESYLATE 5 MG: 5 TABLET ORAL at 05:35

## 2024-02-13 RX ADMIN — LEVOTHYROXINE SODIUM 112 MCG: 0.11 TABLET ORAL at 05:35

## 2024-02-13 NOTE — H&P
"Hospital Medicine History & Physical Note    Date of Service  2/12/2024    Primary Care Physician  Susi Grant M.D.        Code Status  Full Code    Chief Complaint  Chief Complaint   Patient presents with    Shortness of Breath    Cough     Family reports cough and chest congestion x3 weeks    Dizziness       History of Presenting Illness  Desiree Pinto is a 78 y.o. female with past med history of hypertension with medication noncompliance, hypothyroidism who presented 2/12/2024 with persistent cough and shortness of breath.  Patient has had an ongoing cough over the last 3 weeks.  States is nonproductive in nature.  States that today she noticed that the cough created worsening pain in her back and her \"lungs\".  States that she has some difficulty breathing at times.  She does have worsening dyspnea on exertion.  She otherwise denies any fevers, chills, nausea, vomiting.  Denies any other upper respiratory like symptoms.  Chest x-ray number department unremarkable.  She did have a mild leukocytosis.  She was found to desaturate to 86% on room air, now requires 2 L supplemental oxygen.    I discussed the plan of care with patient and family ER physician and ER nurse    Review of Systems  Review of Systems   Constitutional:  Negative for chills and fever.   HENT:  Negative for congestion, ear discharge, ear pain, nosebleeds and sinus pain.    Eyes:  Negative for photophobia, pain and discharge.   Respiratory:  Positive for cough, shortness of breath and wheezing. Negative for hemoptysis and sputum production.    Cardiovascular:  Negative for chest pain, palpitations and orthopnea.   Gastrointestinal:  Negative for abdominal pain, diarrhea, nausea and vomiting.   Genitourinary:  Negative for dysuria, frequency, hematuria and urgency.   Musculoskeletal:  Positive for back pain. Negative for joint pain and neck pain.   Neurological:  Negative for dizziness, tingling, tremors and headaches. "   Psychiatric/Behavioral:  Negative for hallucinations and substance abuse. The patient is not nervous/anxious.        Past Medical History   has a past medical history of ASTHMA, Breast mass, Dyslipidemia, Heart attack (HCC), HTN, Post-menopausal, and Thyroid disease.    Surgical History   has a past surgical history that includes lumbar laminectomy diskectomy (Bilateral, 10/18/2019).     Family History  family history includes Heart Disease in her father.   Family history reviewed with patient. There is no family history that is pertinent to the chief complaint.     Social History   reports that she has never smoked. She has never used smokeless tobacco. She reports that she does not drink alcohol and does not use drugs.    Allergies  Allergies   Allergen Reactions    Pcn [Penicillins] Rash     .    Tylenol [Acetaminophen] Unspecified     Dizziness        Medications  Prior to Admission Medications   Prescriptions Last Dose Informant Patient Reported? Taking?   ibuprofen (MOTRIN) 200 MG Tab 2/10/2024 at unk  Yes Yes   Sig: Take 200 mg by mouth 2 times a day as needed for Mild Pain.      Facility-Administered Medications: None       Physical Exam  Temp:  [36.4 °C (97.5 °F)] 36.4 °C (97.5 °F)  Pulse:  [] 91  Resp:  [15-22] 15  BP: (173-193)/() 186/85  SpO2:  [87 %-95 %] 94 %  Blood Pressure : (!) 186/85   Temperature: 36.4 °C (97.5 °F)   Pulse: 91   Respiration: 15   Pulse Oximetry: 94 %       Physical Exam  Constitutional:       General: She is not in acute distress.     Appearance: Normal appearance. She is normal weight. She is not ill-appearing, toxic-appearing or diaphoretic.   HENT:      Head: Normocephalic and atraumatic.      Nose: Nose normal.      Mouth/Throat:      Mouth: Mucous membranes are moist.   Eyes:      Extraocular Movements: Extraocular movements intact.      Pupils: Pupils are equal, round, and reactive to light.   Cardiovascular:      Rate and Rhythm: Normal rate and regular  "rhythm.      Pulses: Normal pulses.      Heart sounds: Normal heart sounds. No murmur heard.     No friction rub. No gallop.   Pulmonary:      Effort: Pulmonary effort is normal. No respiratory distress.      Breath sounds: No stridor. Wheezing present. No rhonchi or rales.   Chest:      Chest wall: No tenderness.   Abdominal:      General: Abdomen is flat. There is no distension.      Palpations: Abdomen is soft. There is no mass.      Tenderness: There is no abdominal tenderness. There is no guarding or rebound.      Hernia: No hernia is present.   Musculoskeletal:         General: No swelling, tenderness, deformity or signs of injury.      Right lower leg: No edema.      Left lower leg: No edema.      Comments:      Skin:     General: Skin is warm and dry.      Capillary Refill: Capillary refill takes less than 2 seconds.      Coloration: Skin is not jaundiced or pale.      Findings: No bruising, erythema, lesion or rash.   Neurological:      General: No focal deficit present.      Mental Status: She is alert. Mental status is at baseline. She is disoriented.      Cranial Nerves: No cranial nerve deficit.      Sensory: No sensory deficit.      Motor: No weakness.      Coordination: Coordination normal.   Psychiatric:         Mood and Affect: Mood normal.         Behavior: Behavior normal.         Laboratory:  Recent Labs     02/12/24  1420   WBC 11.3*   RBC 5.13   HEMOGLOBIN 15.6   HEMATOCRIT 43.9   MCV 85.6   MCH 30.4   MCHC 35.5   RDW 45.1   PLATELETCT 227   MPV 10.7     Recent Labs     02/12/24  1420   SODIUM 139   POTASSIUM 3.2*   CHLORIDE 99   CO2 25   GLUCOSE 108*   BUN 13   CREATININE 0.76   CALCIUM 9.4     Recent Labs     02/12/24  1420   ALTSGPT 17   ASTSGOT 24   ALKPHOSPHAT 103*   TBILIRUBIN 0.4   GLUCOSE 108*         No results for input(s): \"NTPROBNP\" in the last 72 hours.      No results for input(s): \"TROPONINT\" in the last 72 hours.    Imaging:  DX-CHEST-PORTABLE (1 VIEW)   Final Result      No " acute cardiopulmonary disease evident.      CT-CTA CHEST PULMONARY ARTERY W/ RECONS    (Results Pending)       X-Ray:  I have personally reviewed the images and compared with prior images.  EKG:  I have personally reviewed the images and compared with prior images.    Assessment/Plan:  Justification for Admission Status  I anticipate this patient will require at least two midnights for appropriate medical management, necessitating inpatient admission because acute Evoxac respiratory failure    Patient will need a Med/Surg bed on MEDICAL service .  The need is secondary to acute hypoxic respiratory failure.    * Acute respiratory failure with hypoxia (HCC)- (present on admission)  Assessment & Plan  Unclear etiology.  Patient currently on room air.  Currently requiring 2 1 to 2 L supplemental oxygen.  Chest x-ray unremarkable.  She does have a mild leukocytosis.  She does not complain of a cough, has been persisting over the last 3 weeks.  This is possibly postviral related.  I do not see any pneumonia on imaging.  CT imaging obtained in the emergency department.  Pending results.  Follow-up procalcitonin levels.  Holding off on antibiotics at this time.  There is some wheezing on physical exam, and a dose of Solu-Medrol ordered in the emergency department.  I will also order albuterol and DuoNebs.    Hypertensive urgency  Assessment & Plan  Patient with systolic blood pressure in the 190s.  She is history medication noncompliance.  1 dose IV hydralazine 10 mg  Starting patient on home medications  Counseled patient on importance of medication compliance.    Hypokalemia- (present on admission)  Assessment & Plan  Replete with IV and p.o. potassium for goal greater than 4  Patient will need daily BMP for monitoring    Hypothyroid- (present on admission)  Assessment & Plan  Resume Synthroid        VTE prophylaxis: enoxaparin ppx

## 2024-02-13 NOTE — ED NOTES
Transport at bedside to  pt. Pt sent up with chart, belongings and family at bedside. Pt on 2L NC.

## 2024-02-13 NOTE — ED NOTES
Report called to JOLYNN Ly.  Pt remains hypertensive after interventions so transport delayed.  Dr. Lucas called on the telephone and updated for additional orders. Per Dr. Lucas, pt okay to go to medical floor and does not need telemetry at this time.  Additional PO and PRN medications ordered by MD. Aliyah RN updated on POC.

## 2024-02-13 NOTE — PROGRESS NOTES
4 Eyes Skin Assessment Completed by JOLYNN Whitney and JOLYNN Olson.    Head WDL  Ears WDL  Nose WDL  Mouth WDL  Neck WDL  Breast/Chest WDL  Shoulder Blades WDL  Spine WDL  (R) Arm/Elbow/Hand WDL  (L) Arm/Elbow/Hand WDL  Abdomen WDL  Groin WDL  Scrotum/Coccyx/Buttocks WDL  (R) Leg WDL  (L) Leg WDL  (R) Heel/Foot/Toe WDL dry skin  (L) Heel/Foot/Toe WDL dry skin          Devices In Places Blood Pressure Cuff      Interventions In Place Pillows    Possible Skin Injury No    Pictures Uploaded Into Epic N/A  Wound Consult Placed N/A  RN Wound Prevention Protocol Ordered No

## 2024-02-13 NOTE — CARE PLAN
Problem: Knowledge Deficit - Standard  Goal: Patient and family/care givers will demonstrate understanding of plan of care, disease process/condition, diagnostic tests and medications  Outcome: Progressing   The patient is Stable - Low risk of patient condition declining or worsening    Shift Goals  Clinical Goals: Work on IS and wean off oxygen  Patient Goals: Discharge  Family Goals: updats.    Progress made toward(s) clinical / shift goals:  Patient pulling 2000 on incentive spirometer and 91% room air.     Patient is not progressing towards the following goals:

## 2024-02-13 NOTE — ASSESSMENT & PLAN NOTE
Replete with IV and p.o. potassium for goal greater than 4  Patient will need daily BMP for monitoring

## 2024-02-13 NOTE — CARE PLAN
The patient is Stable - Low risk of patient condition declining or worsening    Shift Goals  Clinical Goals: monitor 02 saturation.  Patient Goals: sleep  Family Goals: updats.    Progress made toward(s) clinical / shift goals:  patient 02 saturation 96% on 2L 02 via NC during this shift.       Patient is not progressing towards the following goals:

## 2024-02-13 NOTE — ASSESSMENT & PLAN NOTE
Patient with systolic blood pressure in the 190s.  She is history medication noncompliance.  1 dose IV hydralazine 10 mg  Starting patient on home medications  Counseled patient on importance of medication compliance.

## 2024-02-13 NOTE — ED NOTES
Bedside report with JOLYNN Anthony.  Pt awaiting transport upstairs.  Pt on 2L NC, connected to monitor. Potassium infusing currently running.  Call light within reach.

## 2024-02-13 NOTE — ASSESSMENT & PLAN NOTE
Unclear etiology.  Patient currently on room air.  Currently requiring 2 1 to 2 L supplemental oxygen.  Chest x-ray unremarkable.  She does have a mild leukocytosis.  She does not complain of a cough, has been persisting over the last 3 weeks.  This is possibly postviral related.  I do not see any pneumonia on imaging.  CT imaging obtained in the emergency department.  Pending results.  Follow-up procalcitonin levels.  Holding off on antibiotics at this time.  There is some wheezing on physical exam, and a dose of Solu-Medrol ordered in the emergency department.  I will also order albuterol and DuoNebs.

## 2024-02-13 NOTE — ED NOTES
Med rec is complete per Patient at bedside with family to interpret.   Per Family, Pt has not had her BP and Thyroid medication in 2 or 3 months.  Per [facility], Pt last filled Levothyroxine 112 mcg QD on 2/28/22 and Amlodipine-Valsartan- HCTZ -25 MG QD on 3/1/22.  [facility] has only filled Doxycycline and Guaifenesin-Codeine today. Pt has not picked up medications yet per [facility].  Allergies reviewed.    Has patient had any outside antibiotics in the last 30 days? N but has Doxycycline for  today.    Any Anticoagulants (rivaroxaban, apixaban, edoxaban, dabigatran, warfarin, enoxaparin) taken in the last 14 days? N           Pharmacy/Pharmacies Pt utilizes : [facility] [phone]

## 2024-02-13 NOTE — PROGRESS NOTES
Received report from ED RN. On arrival to unit pt ambulatory to bed. AAOX4, Vietnamese speaker. Denies pain.. family at bedside. Educated on room surroundings, the use of call light and care plan, visitation  hours.   Skin check done. Bed locked and in low position, call bell within reach. Will continue to monitor.

## 2024-02-13 NOTE — PROGRESS NOTES
Discharge orders acknowledged, Pt aware and compliant with new orders, D/C teaching completed, Pt able to verbalize needs and complaint with discharge orders. Personal belongings in pt possession. Medication sent to pt pharmacy. PIV removed.  Pt escorted off unit via wheelchair with assistance from CNA.

## 2024-02-14 NOTE — DISCHARGE SUMMARY
Hospital Medicine Discharge Note     Admit Date:  2/12/2024       Discharge Date:   2/13/2024    Attending Physician:  Wes Ledbetter M.D.     Diagnoses (includes active and resolved):   Principal Problem:    Acute respiratory failure with hypoxia (HCC) (POA: Yes)  Active Problems:    Hypothyroid (POA: Yes)    Hypokalemia (POA: Yes)    Hypertensive urgency (POA: Unknown)  Resolved Problems:    * No resolved hospital problems. *      Hospital Summary (Brief Narrative):        78 y.o. female with past med history of hypertension with medication noncompliance, hypothyroidism who presented 2/12/2024 with persistent cough and shortness of breath.  Patient has had an ongoing cough over the last 3 weeks.   Chest x-ray number department unremarkable.  She did have a mild leukocytosis.  She was found to desaturate to 86% on room air and admitted due to her need of 2 L of oxygen.  She had a CTA of the lung which was unremarkable.  She was negative for COVID/flu/RSV.  Her cough improved.  She was able to be weaned off of oxygen.  Though she was able to be discharged home with Tessalon.     The patient recovered much more quickly than anticipated on admission. During admission, she  was admitted inpatient because it was expected that it would take greater than 2 midnights for medical management. Because of the remarkable speed of which the interventions were done, along w/ the patient's own unexpectedly quick recovery, she was discharged after only one night.     Consultants:      None    Procedures:        None    Discharge Medications:           Medication List        START taking these medications        Instructions   amLODIPine 10 MG Tabs  Start taking on: February 14, 2024  Commonly known as: Norvasc   Take 1 Tablet by mouth every day.  Dose: 10 mg     benzonatate 100 MG Caps  Commonly known as: Tessalon   Take 1 Capsule by mouth 3 times a day as needed for Cough.  Dose: 100 mg     doxycycline 100 MG capsule  Commonly known  as: Monodox   Take 1 Capsule by mouth 2 times a day.  Dose: 100 mg            CONTINUE taking these medications        Instructions   guaifenesin-codeine Soln oral solution  Commonly known as: Robitussin AC   Take 5 mL by mouth every four hours as needed for Cough for up to 5 days.  Dose: 5 mL            STOP taking these medications      doxycycline 100 MG Tabs  Commonly known as: Vibramycin     ibuprofen 200 MG Tabs  Commonly known as: Motrin            Disposition:   Discharge home    Activity:   As tolerated    Code status:   Full code    Primary Care Provider:    Susi Grant M.D.    Follow up appointment details :      Susi Grant M.D.  [id number] [id number]  [phone]    Schedule an appointment as soon as possible for a visit in 1 week(s)  If symptoms worsen    No future appointments.    Pending Studies:        None    Time spent on discharge day patient visit: 41 minutes    #################################################    Interval history/exam for day of discharge:    Vitals:    02/13/24 0824 02/13/24 1000 02/13/24 1034 02/13/24 1100   BP: (!) 159/96      Pulse: 84      Resp: 16      Temp: 36.5 °C (97.7 °F)      TempSrc: Temporal      SpO2: 94% 90% 91% 91%   Weight:       Height:         Weight/BMI: Body mass index is 34.85 kg/m².  Pulse Oximetry: 91 %, O2 (LPM): 2, O2 Delivery Device: None - Room Air    Most Recent Labs:    Lab Results   Component Value Date/Time    WBC 11.5 (H) 02/13/2024 08:41 AM    RBC 4.85 02/13/2024 08:41 AM    HEMOGLOBIN 14.3 02/13/2024 08:41 AM    HEMATOCRIT 42.5 02/13/2024 08:41 AM    MCV 87.6 02/13/2024 08:41 AM    MCH 29.5 02/13/2024 08:41 AM    MCHC 33.6 02/13/2024 08:41 AM    MPV 11.3 02/13/2024 08:41 AM    NEUTSPOLYS 84.70 (H) 02/12/2024 02:20 PM    LYMPHOCYTES 8.30 (L) 02/12/2024 02:20 PM    MONOCYTES 3.70 02/12/2024 02:20 PM    EOSINOPHILS 2.30 02/12/2024 02:20 PM    BASOPHILS 0.50 02/12/2024 02:20 PM    HYPOCHROMIA 1+ 09/28/2012 08:30  PM      Lab Results   Component Value Date/Time    SODIUM 141 02/13/2024 08:41 AM    POTASSIUM 4.3 02/13/2024 08:41 AM    CHLORIDE 106 02/13/2024 08:41 AM    CO2 26 02/13/2024 08:41 AM    GLUCOSE 133 (H) 02/13/2024 08:41 AM    BUN 20 02/13/2024 08:41 AM    CREATININE 0.65 02/13/2024 08:41 AM    CREATININE 0.8 12/10/2008 06:54 AM      Lab Results   Component Value Date/Time    ALTSGPT 17 02/12/2024 02:20 PM    ASTSGOT 24 02/12/2024 02:20 PM    ALKPHOSPHAT 103 (H) 02/12/2024 02:20 PM    TBILIRUBIN 0.4 02/12/2024 02:20 PM    LIPASE 30 06/27/2011 10:15 AM    ALBUMIN 4.2 02/13/2024 08:41 AM    GLOBULIN 3.7 (H) 02/12/2024 02:20 PM    INR 0.94 10/17/2019 07:20 AM     Lab Results   Component Value Date/Time    PROTHROMBTM 12.8 10/17/2019 07:20 AM    INR 0.94 10/17/2019 07:20 AM        Imaging/ Testing:      CT-CTA CHEST PULMONARY ARTERY W/ RECONS   Final Result      1.  Negative for pulmonary embolism      2.  Minimal dependent atelectasis      3.  Aortic atherosclerotic plaque            DX-CHEST-PORTABLE (1 VIEW)   Final Result      No acute cardiopulmonary disease evident.          Instructions:      The patient was instructed to return to the ER in the event of worsening symptoms. I have counseled the patient on the importance of compliance and the patient has agreed to proceed with all medical recommendations and follow up plan indicated above.   The patient understands that all medications come with benefits and risks. Risks may include permanent injury or death and these risks can be minimized with close reassessment and monitoring.

## 2024-02-15 LAB
BACTERIA UR CULT: NORMAL
SIGNIFICANT IND 70042: NORMAL
SITE SITE: NORMAL
SOURCE SOURCE: NORMAL

## 2024-02-17 LAB
BACTERIA BLD CULT: NORMAL
BACTERIA BLD CULT: NORMAL
SIGNIFICANT IND 70042: NORMAL
SIGNIFICANT IND 70042: NORMAL
SITE SITE: NORMAL
SITE SITE: NORMAL
SOURCE SOURCE: NORMAL
SOURCE SOURCE: NORMAL

## 2024-06-26 ENCOUNTER — TELEPHONE (OUTPATIENT)
Dept: HEALTH INFORMATION MANAGEMENT | Facility: OTHER | Age: 79
End: 2024-06-26

## (undated) DEVICE — SURGIFOAM (12X7) - (12EA/CA)

## (undated) DEVICE — SUTURE 2-0 ETHILON FS - (36/BX) 18 INCH

## (undated) DEVICE — NEEDLE, DISP  18G X 1-1/2 BLUNT

## (undated) DEVICE — DRESSING,POST OP BORDER 4INX6IN AG

## (undated) DEVICE — SUTURE GENERAL

## (undated) DEVICE — SUCTION INSTRUMENT YANKAUER BULBOUS TIP W/O VENT (50EA/CA)

## (undated) DEVICE — DRAPE STRLE REG TOWEL 18X24 - (10/BX 4BX/CA)"

## (undated) DEVICE — LACTATED RINGERS INJ 1000 ML - (14EA/CA 60CA/PF)

## (undated) DEVICE — GLOVE BIOGEL SZ 6 PF LATEX - (50EA/BX 4BX/CA)

## (undated) DEVICE — SLEEVE, VASO, THIGH, MED

## (undated) DEVICE — KIT SURGIFLO W/OUT THROMBIN - (6EA/CA)

## (undated) DEVICE — PACK JACKSON TABLE KIT W/OUT - HR (6EA/CA)

## (undated) DEVICE — SEALER BIPOLAR 2.3 AQUAMANTYS

## (undated) DEVICE — KIT EVACUATER 3 SPRING PVC LF 1/8 DRAIN SIZE (10EA/CA)"

## (undated) DEVICE — TUBING C&T SET FLYING LEADS DRAIN TUBING (10EA/BX)

## (undated) DEVICE — SODIUM CHL IRRIGATION 0.9% 1000ML (12EA/CA)

## (undated) DEVICE — ARMREST CRADLE FOAM - (2PR/PK 12PR/CA)

## (undated) DEVICE — DRAPE 36X28IN RAD CARM BND BG - (25/CA) O

## (undated) DEVICE — NEPTUNE 4 PORT MANIFOLD - (20/PK)

## (undated) DEVICE — NEEDLE, DISP 16G X 1-1/2 BLUNT

## (undated) DEVICE — TOOL DISSECT MATCH HEAD

## (undated) DEVICE — NEEDLE NON SAFETY HYPO 22 GA X 1 1/2 IN (100/BX)

## (undated) DEVICE — GOWN WARMING STANDARD FLEX - (30/CA)

## (undated) DEVICE — SYRINGE SAFETY 3 ML 18 GA X 1 1/2 BLUNT LL (100/BX 8BX/CA)

## (undated) DEVICE — DRAPE LAPAROTOMY T SHEET - (12EA/CA)

## (undated) DEVICE — SUTURE 3-0 VICRYL PLUS SH - 8X 18 INCH (12/BX)

## (undated) DEVICE — GLOVE PROTEXIS W/NEU-THERA SZ 8.5 (50PR/BX)

## (undated) DEVICE — SET LEADWIRE 5 LEAD BEDSIDE DISPOSABLE ECG (1SET OF 5/EA)

## (undated) DEVICE — PROTECTOR ULNA NERVE - (36PR/CA)

## (undated) DEVICE — TUBE E-T HI-LO CUFF 6.5MM (10EA/BX)

## (undated) DEVICE — SUTURE 1 VICRYL PLUS CTX - 8 X 18 INCH (12/BX)

## (undated) DEVICE — KIT ANESTHESIA W/CIRCUIT & 3/LT BAG W/FILTER (20EA/CA)

## (undated) DEVICE — NEEDLE SPINAL NON-SAFETY 18 GA X 3 IN (25EA/BX)

## (undated) DEVICE — SENSOR SPO2 NEO LNCS ADHESIVE (20/BX) SEE USER NOTES

## (undated) DEVICE — DRESSING POST OP BORDER 4 X 10 (5EA/BX)

## (undated) DEVICE — APPLICATOR SURGIFLO - (6EA/CA)

## (undated) DEVICE — TRAY SRGPRP PVP IOD WT PRP - (20/CA)

## (undated) DEVICE — SPONGE GAUZESTER 4 X 4 4PLY - (128PK/CA)

## (undated) DEVICE — MIDAS LUBRICATOR DIFFUSER PACK (4EA/CA)

## (undated) DEVICE — SURGIFOAM (SIZE 100) - (6EA/CA)

## (undated) DEVICE — ELECTRODE DUAL RETURN W/ CORD - (50/PK)

## (undated) DEVICE — CHLORAPREP 26 ML APPLICATOR - ORANGE TINT(25/CA)

## (undated) DEVICE — SYRINGE SAFETY 10 ML 18 GA X 1 1/2 BLUNT LL (100/BX 4BX/CA)

## (undated) DEVICE — HEADREST PRONEVIEW LARGE - (10/CA)

## (undated) DEVICE — GLOVESZ 8.5 BIOGEL PI MICRO - PF LF (50PR/BX)

## (undated) DEVICE — ELECTRODE 850 FOAM ADHESIVE - HYDROGEL RADIOTRNSPRNT (50/PK)

## (undated) DEVICE — DRAPE LARGE 3 QUARTER - (20/CA)

## (undated) DEVICE — TUBING CLEARLINK DUO-VENT - C-FLO (48EA/CA)

## (undated) DEVICE — MASK ANESTHESIA ADULT  - (100/CA)

## (undated) DEVICE — GLOVE BIOGEL SZ 7.5 SURGICAL PF LTX - (50PR/BX 4BX/CA)

## (undated) DEVICE — GOWN SURGEONS X-LARGE - DISP. (30/CA)

## (undated) DEVICE — CANISTER SUCTION 3000ML MECHANICAL FILTER AUTO SHUTOFF MEDI-VAC NONSTERILE LF DISP  (40EA/CA)

## (undated) DEVICE — GLOVE BIOGEL INDICATOR SZ 6.5 SURGICAL PF LTX - (50PR/BX 4BX/CA)

## (undated) DEVICE — LACTATED RINGERS INJ. 500 ML - (24EA/CA)

## (undated) DEVICE — BOVIE BLADE COATED &INSULATED - 25/PK

## (undated) DEVICE — PACK NEURO - (2EA/CA)

## (undated) DEVICE — KIT ROOM DECONTAMINATION

## (undated) DEVICE — SET EXTENSION WITH 2 PORTS (48EA/CA) ***PART #2C8610 IS A SUBSTITUTE*****